# Patient Record
Sex: FEMALE | Race: WHITE | Employment: PART TIME | URBAN - METROPOLITAN AREA
[De-identification: names, ages, dates, MRNs, and addresses within clinical notes are randomized per-mention and may not be internally consistent; named-entity substitution may affect disease eponyms.]

---

## 2017-02-13 ENCOUNTER — OFFICE VISIT (OUTPATIENT)
Dept: INTERNAL MEDICINE CLINIC | Age: 52
End: 2017-02-13

## 2017-02-13 VITALS
BODY MASS INDEX: 36 KG/M2 | DIASTOLIC BLOOD PRESSURE: 64 MMHG | SYSTOLIC BLOOD PRESSURE: 106 MMHG | HEIGHT: 63 IN | HEART RATE: 84 BPM | WEIGHT: 203.2 LBS

## 2017-02-13 DIAGNOSIS — F33.0 MILD EPISODE OF RECURRENT MAJOR DEPRESSIVE DISORDER (HCC): Primary | ICD-10-CM

## 2017-02-13 DIAGNOSIS — F41.9 ANXIETY: ICD-10-CM

## 2017-02-13 PROCEDURE — 99213 OFFICE O/P EST LOW 20 MIN: CPT | Performed by: INTERNAL MEDICINE

## 2017-02-23 ENCOUNTER — TELEPHONE (OUTPATIENT)
Dept: INTERNAL MEDICINE CLINIC | Age: 52
End: 2017-02-23

## 2017-02-23 RX ORDER — LEVOTHYROXINE SODIUM 0.05 MG/1
TABLET ORAL
Qty: 90 TABLET | Refills: 3 | Status: SHIPPED | OUTPATIENT
Start: 2017-02-23 | End: 2017-09-05 | Stop reason: SDUPTHER

## 2017-03-02 RX ORDER — LISINOPRIL 20 MG/1
TABLET ORAL
Qty: 90 TABLET | Refills: 1 | Status: SHIPPED | OUTPATIENT
Start: 2017-03-02 | End: 2017-08-30 | Stop reason: SDUPTHER

## 2017-04-19 ENCOUNTER — PATIENT MESSAGE (OUTPATIENT)
Dept: INTERNAL MEDICINE CLINIC | Age: 52
End: 2017-04-19

## 2017-04-28 RX ORDER — HYDROCHLOROTHIAZIDE 25 MG/1
TABLET ORAL
Qty: 90 TABLET | Refills: 1 | Status: SHIPPED | OUTPATIENT
Start: 2017-04-28 | End: 2017-09-05 | Stop reason: SDUPTHER

## 2017-04-28 RX ORDER — SERTRALINE HYDROCHLORIDE 100 MG/1
100 TABLET, FILM COATED ORAL DAILY
Qty: 30 TABLET | Refills: 5 | Status: SHIPPED | OUTPATIENT
Start: 2017-04-28 | End: 2017-09-05 | Stop reason: SDUPTHER

## 2017-05-25 ENCOUNTER — OFFICE VISIT (OUTPATIENT)
Dept: INTERNAL MEDICINE CLINIC | Age: 52
End: 2017-05-25

## 2017-05-25 VITALS
BODY MASS INDEX: 35.37 KG/M2 | WEIGHT: 199.6 LBS | HEART RATE: 64 BPM | HEIGHT: 63 IN | OXYGEN SATURATION: 97 % | SYSTOLIC BLOOD PRESSURE: 116 MMHG | DIASTOLIC BLOOD PRESSURE: 82 MMHG

## 2017-05-25 DIAGNOSIS — R07.9 CHEST PAIN, UNSPECIFIED TYPE: Primary | ICD-10-CM

## 2017-05-25 DIAGNOSIS — H81.10 BPV (BENIGN POSITIONAL VERTIGO), UNSPECIFIED LATERALITY: ICD-10-CM

## 2017-05-25 PROCEDURE — 93000 ELECTROCARDIOGRAM COMPLETE: CPT | Performed by: INTERNAL MEDICINE

## 2017-05-25 PROCEDURE — 99213 OFFICE O/P EST LOW 20 MIN: CPT | Performed by: INTERNAL MEDICINE

## 2017-05-25 RX ORDER — MECLIZINE HYDROCHLORIDE 25 MG/1
25 TABLET ORAL 3 TIMES DAILY PRN
Qty: 30 TABLET | Refills: 0 | Status: SHIPPED | OUTPATIENT
Start: 2017-05-25 | End: 2017-06-04

## 2017-05-25 ASSESSMENT — PATIENT HEALTH QUESTIONNAIRE - PHQ9
SUM OF ALL RESPONSES TO PHQ9 QUESTIONS 1 & 2: 0
1. LITTLE INTEREST OR PLEASURE IN DOING THINGS: 0
SUM OF ALL RESPONSES TO PHQ QUESTIONS 1-9: 0
2. FEELING DOWN, DEPRESSED OR HOPELESS: 0

## 2017-09-05 RX ORDER — LEVOTHYROXINE SODIUM 0.05 MG/1
TABLET ORAL
Qty: 90 TABLET | Refills: 3 | Status: SHIPPED | OUTPATIENT
Start: 2017-09-05 | End: 2018-08-27 | Stop reason: SDUPTHER

## 2017-09-05 RX ORDER — LISINOPRIL 20 MG/1
20 TABLET ORAL DAILY
Qty: 90 TABLET | Refills: 3 | Status: SHIPPED | OUTPATIENT
Start: 2017-09-05 | End: 2018-08-27 | Stop reason: SDUPTHER

## 2017-09-05 RX ORDER — HYDROCHLOROTHIAZIDE 25 MG/1
25 TABLET ORAL DAILY
Qty: 90 TABLET | Refills: 3 | Status: SHIPPED | OUTPATIENT
Start: 2017-09-05 | End: 2018-08-27 | Stop reason: SDUPTHER

## 2017-09-05 RX ORDER — SERTRALINE HYDROCHLORIDE 100 MG/1
100 TABLET, FILM COATED ORAL DAILY
Qty: 30 TABLET | Refills: 5 | Status: CANCELLED | OUTPATIENT
Start: 2017-09-05

## 2017-09-05 RX ORDER — TRIAMCINOLONE ACETONIDE 1 MG/G
CREAM TOPICAL
Qty: 80 G | Refills: 3 | Status: SHIPPED | OUTPATIENT
Start: 2017-09-05 | End: 2019-12-19 | Stop reason: SDUPTHER

## 2017-09-05 RX ORDER — SERTRALINE HYDROCHLORIDE 100 MG/1
100 TABLET, FILM COATED ORAL DAILY
Qty: 90 TABLET | Refills: 3 | Status: SHIPPED | OUTPATIENT
Start: 2017-09-05 | End: 2017-10-23 | Stop reason: SDUPTHER

## 2017-10-23 RX ORDER — SERTRALINE HYDROCHLORIDE 100 MG/1
100 TABLET, FILM COATED ORAL DAILY
Qty: 90 TABLET | Refills: 3 | Status: SHIPPED | OUTPATIENT
Start: 2017-10-23 | End: 2018-08-27 | Stop reason: SDUPTHER

## 2018-08-17 RX ORDER — IBUPROFEN 800 MG/1
TABLET ORAL
Qty: 120 TABLET | Refills: 2 | Status: SHIPPED | OUTPATIENT
Start: 2018-08-17 | End: 2018-09-27 | Stop reason: SDUPTHER

## 2018-08-27 ENCOUNTER — OFFICE VISIT (OUTPATIENT)
Dept: INTERNAL MEDICINE CLINIC | Age: 53
End: 2018-08-27

## 2018-08-27 VITALS
HEIGHT: 63 IN | DIASTOLIC BLOOD PRESSURE: 76 MMHG | WEIGHT: 204 LBS | BODY MASS INDEX: 36.14 KG/M2 | HEART RATE: 88 BPM | SYSTOLIC BLOOD PRESSURE: 108 MMHG

## 2018-08-27 DIAGNOSIS — I10 ESSENTIAL HYPERTENSION, BENIGN: Primary | ICD-10-CM

## 2018-08-27 DIAGNOSIS — F41.9 ANXIETY: ICD-10-CM

## 2018-08-27 DIAGNOSIS — Z12.31 ENCOUNTER FOR SCREENING MAMMOGRAM FOR BREAST CANCER: ICD-10-CM

## 2018-08-27 DIAGNOSIS — E03.9 ACQUIRED HYPOTHYROIDISM: ICD-10-CM

## 2018-08-27 DIAGNOSIS — Z12.11 SCREENING FOR COLON CANCER: ICD-10-CM

## 2018-08-27 PROCEDURE — 99213 OFFICE O/P EST LOW 20 MIN: CPT | Performed by: INTERNAL MEDICINE

## 2018-08-27 RX ORDER — SERTRALINE HYDROCHLORIDE 100 MG/1
100 TABLET, FILM COATED ORAL DAILY
Qty: 90 TABLET | Refills: 3 | Status: SHIPPED | OUTPATIENT
Start: 2018-08-27 | End: 2018-09-27 | Stop reason: SDUPTHER

## 2018-08-27 RX ORDER — LISINOPRIL 20 MG/1
20 TABLET ORAL DAILY
Qty: 90 TABLET | Refills: 3 | Status: SHIPPED | OUTPATIENT
Start: 2018-08-27 | End: 2018-09-25 | Stop reason: SDUPTHER

## 2018-08-27 RX ORDER — LEVOTHYROXINE SODIUM 0.05 MG/1
TABLET ORAL
Qty: 90 TABLET | Refills: 3 | Status: SHIPPED | OUTPATIENT
Start: 2018-08-27 | End: 2018-09-27 | Stop reason: SDUPTHER

## 2018-08-27 RX ORDER — HYDROCHLOROTHIAZIDE 25 MG/1
25 TABLET ORAL DAILY
Qty: 90 TABLET | Refills: 3 | Status: SHIPPED | OUTPATIENT
Start: 2018-08-27 | End: 2018-09-27 | Stop reason: SDUPTHER

## 2018-08-27 ASSESSMENT — PATIENT HEALTH QUESTIONNAIRE - PHQ9
SUM OF ALL RESPONSES TO PHQ QUESTIONS 1-9: 0
1. LITTLE INTEREST OR PLEASURE IN DOING THINGS: 0
SUM OF ALL RESPONSES TO PHQ QUESTIONS 1-9: 0
2. FEELING DOWN, DEPRESSED OR HOPELESS: 0
SUM OF ALL RESPONSES TO PHQ9 QUESTIONS 1 & 2: 0

## 2018-09-12 LAB
CONTROL: NORMAL
HEMOCCULT STL QL: NORMAL

## 2018-09-12 PROCEDURE — 82274 ASSAY TEST FOR BLOOD FECAL: CPT | Performed by: INTERNAL MEDICINE

## 2018-09-17 DIAGNOSIS — I10 ESSENTIAL HYPERTENSION, BENIGN: ICD-10-CM

## 2018-09-17 DIAGNOSIS — E03.9 ACQUIRED HYPOTHYROIDISM: ICD-10-CM

## 2018-09-17 LAB
ALBUMIN SERPL-MCNC: 4.2 G/DL (ref 3.4–5)
ANION GAP SERPL CALCULATED.3IONS-SCNC: 15 MMOL/L (ref 3–16)
BUN BLDV-MCNC: 17 MG/DL (ref 7–20)
CALCIUM SERPL-MCNC: 9.8 MG/DL (ref 8.3–10.6)
CHLORIDE BLD-SCNC: 103 MMOL/L (ref 99–110)
CO2: 28 MMOL/L (ref 21–32)
CREAT SERPL-MCNC: 0.7 MG/DL (ref 0.6–1.1)
GFR AFRICAN AMERICAN: >60
GFR NON-AFRICAN AMERICAN: >60
GLUCOSE BLD-MCNC: 90 MG/DL (ref 70–99)
PHOSPHORUS: 3.3 MG/DL (ref 2.5–4.9)
POTASSIUM SERPL-SCNC: 4.1 MMOL/L (ref 3.5–5.1)
SODIUM BLD-SCNC: 146 MMOL/L (ref 136–145)
TSH REFLEX: 3.66 UIU/ML (ref 0.27–4.2)

## 2018-09-25 RX ORDER — LISINOPRIL 20 MG/1
TABLET ORAL
Qty: 90 TABLET | Refills: 3 | Status: SHIPPED | OUTPATIENT
Start: 2018-09-25 | End: 2019-04-01 | Stop reason: SDUPTHER

## 2018-09-27 RX ORDER — LEVOTHYROXINE SODIUM 0.05 MG/1
TABLET ORAL
Qty: 90 TABLET | Refills: 3 | Status: SHIPPED | OUTPATIENT
Start: 2018-09-27 | End: 2019-04-01 | Stop reason: SDUPTHER

## 2018-09-27 RX ORDER — IBUPROFEN 800 MG/1
TABLET ORAL
Qty: 120 TABLET | Refills: 5 | Status: SHIPPED | OUTPATIENT
Start: 2018-09-27 | End: 2019-10-21 | Stop reason: SDUPTHER

## 2018-09-27 RX ORDER — HYDROCHLOROTHIAZIDE 25 MG/1
25 TABLET ORAL DAILY
Qty: 90 TABLET | Refills: 3 | Status: SHIPPED | OUTPATIENT
Start: 2018-09-27 | End: 2019-04-01 | Stop reason: SDUPTHER

## 2018-09-27 RX ORDER — SERTRALINE HYDROCHLORIDE 100 MG/1
100 TABLET, FILM COATED ORAL DAILY
Qty: 90 TABLET | Refills: 3 | Status: SHIPPED | OUTPATIENT
Start: 2018-09-27 | End: 2019-04-01 | Stop reason: SDUPTHER

## 2018-12-27 ENCOUNTER — TELEPHONE (OUTPATIENT)
Dept: INTERNAL MEDICINE CLINIC | Age: 53
End: 2018-12-27

## 2019-04-01 ENCOUNTER — OFFICE VISIT (OUTPATIENT)
Dept: INTERNAL MEDICINE CLINIC | Age: 54
End: 2019-04-01

## 2019-04-01 VITALS
WEIGHT: 205.4 LBS | DIASTOLIC BLOOD PRESSURE: 84 MMHG | HEIGHT: 63 IN | HEART RATE: 80 BPM | SYSTOLIC BLOOD PRESSURE: 130 MMHG | BODY MASS INDEX: 36.39 KG/M2

## 2019-04-01 DIAGNOSIS — F41.9 ANXIETY: ICD-10-CM

## 2019-04-01 DIAGNOSIS — I10 ESSENTIAL HYPERTENSION, BENIGN: Primary | ICD-10-CM

## 2019-04-01 DIAGNOSIS — E03.9 ACQUIRED HYPOTHYROIDISM: ICD-10-CM

## 2019-04-01 PROCEDURE — 99212 OFFICE O/P EST SF 10 MIN: CPT | Performed by: NURSE PRACTITIONER

## 2019-04-01 RX ORDER — SERTRALINE HYDROCHLORIDE 100 MG/1
100 TABLET, FILM COATED ORAL DAILY
Qty: 90 TABLET | Refills: 3 | Status: SHIPPED | OUTPATIENT
Start: 2019-04-01 | End: 2019-11-04

## 2019-04-01 RX ORDER — HYDROCHLOROTHIAZIDE 25 MG/1
25 TABLET ORAL DAILY
Qty: 90 TABLET | Refills: 3 | Status: SHIPPED | OUTPATIENT
Start: 2019-04-01 | End: 2020-06-04

## 2019-04-01 RX ORDER — LEVOTHYROXINE SODIUM 0.05 MG/1
TABLET ORAL
Qty: 90 TABLET | Refills: 3 | Status: SHIPPED | OUTPATIENT
Start: 2019-04-01 | End: 2020-05-29

## 2019-04-01 RX ORDER — LISINOPRIL 20 MG/1
TABLET ORAL
Qty: 90 TABLET | Refills: 3 | Status: SHIPPED | OUTPATIENT
Start: 2019-04-01 | End: 2020-02-03

## 2019-04-01 ASSESSMENT — ENCOUNTER SYMPTOMS
WHEEZING: 0
CHEST TIGHTNESS: 0
NAUSEA: 0
BLOOD IN STOOL: 0
DIARRHEA: 0
SHORTNESS OF BREATH: 0
EYE REDNESS: 0
VOMITING: 0
APNEA: 0
RHINORRHEA: 0
ABDOMINAL PAIN: 0
EYE PAIN: 0
SINUS PRESSURE: 0
COUGH: 0
BACK PAIN: 0
CONSTIPATION: 0
ABDOMINAL DISTENTION: 0

## 2019-04-01 NOTE — PROGRESS NOTES
HPI:  4/1/2019    This is a 46 y. o.female   Chief Complaint   Patient presents with    Hypertension    Hypothyroidism    Anxiety     HPI    Isidoro Huitron returns for follow up of hypertension. Patient has been taking Her medications as prescribed. Patient's blood pressure is  controlled. Side effects related to taking the medications include no medication side effects noted. Isidoro Huitron returns for follow up of her hypothyroidism. She has not been having symptoms related to Her thyroid condition. She is not taking Her medications as prescribed. Anxiety- on sertraline   Feeling okay  Was taking 100 mg increased to 150 mg   Doing well on increased dose. /84   Pulse 80   Ht 5' 3\" (1.6 m)   Wt 205 lb 6.4 oz (93.2 kg)   BMI 36.38 kg/m²     Allergies   Allergen Reactions    Morphine Itching       Current Outpatient Medications   Medication Sig Dispense Refill    sertraline (ZOLOFT) 100 MG tablet Take 1 tablet by mouth daily 90 tablet 3    sertraline (ZOLOFT) 50 MG tablet TAKE 1 TABLET BY MOUTH ONCE DAILY COMBINE  WITH  100MG  TABLET  TO  TAKE  TOTAL  OF  150MG 90 tablet 1    levothyroxine (SYNTHROID) 50 MCG tablet TAKE ONE TABLET BY MOUTH ONCE DAILY 90 tablet 3    hydrochlorothiazide (HYDRODIURIL) 25 MG tablet Take 1 tablet by mouth daily 90 tablet 3    lisinopril (PRINIVIL;ZESTRIL) 20 MG tablet TAKE ONE TABLET BY MOUTH ONCE DAILY 90 tablet 3    ibuprofen (IBU) 800 MG tablet TAKE ONE TABLET BY MOUTH EVERY 6 HOURS AS NEEDED FOR PAIN 120 tablet 5    triamcinolone (KENALOG) 0.1 % cream Apply topically 2 times daily. 80 g 3     No current facility-administered medications for this visit. Review of Systems   Constitutional: Negative for appetite change, chills, fatigue and fever. HENT: Negative for congestion, ear pain, rhinorrhea and sinus pressure. Eyes: Negative for pain, redness and visual disturbance.    Respiratory: Negative for apnea, cough, chest tightness, shortness of breath and wheezing. Cardiovascular: Negative for chest pain, palpitations and leg swelling. Gastrointestinal: Negative for abdominal distention, abdominal pain, blood in stool, constipation, diarrhea, nausea and vomiting. Endocrine: Negative for cold intolerance, heat intolerance, polydipsia, polyphagia and polyuria. Genitourinary: Negative for difficulty urinating, dysuria, enuresis, frequency, hematuria and urgency. Musculoskeletal: Negative for back pain, gait problem, joint swelling and neck pain. Skin: Negative for rash and wound. Allergic/Immunologic: Negative for environmental allergies, food allergies and immunocompromised state. Neurological: Negative for dizziness, syncope, light-headedness, numbness and headaches. Hematological: Negative for adenopathy. Does not bruise/bleed easily. Psychiatric/Behavioral: Negative for agitation, behavioral problems and confusion. The patient is not nervous/anxious. Physical Exam   Constitutional: She is oriented to person, place, and time. She appears well-developed and well-nourished. No distress. HENT:   Head: Normocephalic and atraumatic. Eyes: Pupils are equal, round, and reactive to light. EOM are normal.   Neck: Normal range of motion. Neck supple. Cardiovascular: Normal rate, regular rhythm, normal heart sounds and intact distal pulses. No murmur heard. Pulmonary/Chest: Effort normal and breath sounds normal. No respiratory distress. She has no wheezes. Abdominal: Soft. Bowel sounds are normal. There is no tenderness. Musculoskeletal: Normal range of motion. She exhibits no edema. Neurological: She is alert and oriented to person, place, and time. She has normal reflexes. Coordination normal.   Skin: Skin is warm and dry. She is not diaphoretic. No erythema. Psychiatric: She has a normal mood and affect. Thought content normal.     Assessment/Plan:  1.  Essential hypertension, benign  Stable, controlled on current

## 2019-06-10 ENCOUNTER — OFFICE VISIT (OUTPATIENT)
Dept: ENT CLINIC | Age: 54
End: 2019-06-10

## 2019-06-10 VITALS — SYSTOLIC BLOOD PRESSURE: 128 MMHG | HEART RATE: 78 BPM | OXYGEN SATURATION: 96 % | DIASTOLIC BLOOD PRESSURE: 78 MMHG

## 2019-06-10 DIAGNOSIS — H90.3 BILATERAL SENSORINEURAL HEARING LOSS: Primary | ICD-10-CM

## 2019-06-10 PROCEDURE — 99202 OFFICE O/P NEW SF 15 MIN: CPT | Performed by: OTOLARYNGOLOGY

## 2019-06-10 NOTE — PROGRESS NOTES
The patient was referred from her  for medical clearance. She was concerned about a cloudy appearance of the right eardrum although the tympanogram was normal.  There was bilateral sensorineural loss. Patient had ear infections as a child but no surgery or trauma. She has worn hearing aids for approximately 10 years. There is no family history of hearing loss. She denies tinnitus or vertigo. On occasion there is a clicking sound in the ear that is not pulsatile and not related to hearing. Both ear canals are free of any cerumen debris. There is mild tympanosclerotic plaque posteroinferiorly on the right eardrum. The remainder of the drum is normal and there appears to be good aeration of the middle ear space bilaterally. The pars flaccida is unremarkable. There is no periauricular adenopathy or cellulitis. I agree that the patient is a good candidate for amplification. Medical clearance forms were filled.   She may return as needed

## 2019-10-21 RX ORDER — IBUPROFEN 800 MG/1
TABLET ORAL
Qty: 120 TABLET | Refills: 0 | Status: SHIPPED | OUTPATIENT
Start: 2019-10-21 | End: 2019-11-04 | Stop reason: SDUPTHER

## 2019-11-04 ENCOUNTER — OFFICE VISIT (OUTPATIENT)
Dept: INTERNAL MEDICINE CLINIC | Age: 54
End: 2019-11-04

## 2019-11-04 VITALS
BODY MASS INDEX: 35.25 KG/M2 | SYSTOLIC BLOOD PRESSURE: 128 MMHG | DIASTOLIC BLOOD PRESSURE: 76 MMHG | HEART RATE: 78 BPM | WEIGHT: 199 LBS

## 2019-11-04 DIAGNOSIS — F41.9 ANXIETY: Primary | ICD-10-CM

## 2019-11-04 DIAGNOSIS — Z12.11 SCREENING FOR COLON CANCER: ICD-10-CM

## 2019-11-04 DIAGNOSIS — E03.9 ACQUIRED HYPOTHYROIDISM: ICD-10-CM

## 2019-11-04 DIAGNOSIS — I10 ESSENTIAL HYPERTENSION, BENIGN: ICD-10-CM

## 2019-11-04 PROCEDURE — 99214 OFFICE O/P EST MOD 30 MIN: CPT | Performed by: NURSE PRACTITIONER

## 2019-11-04 RX ORDER — IBUPROFEN 800 MG/1
TABLET ORAL
Qty: 120 TABLET | Refills: 0 | Status: SHIPPED | OUTPATIENT
Start: 2019-11-04 | End: 2020-02-03

## 2019-11-04 ASSESSMENT — ENCOUNTER SYMPTOMS
DIARRHEA: 0
CONSTIPATION: 0
SINUS PRESSURE: 0
APNEA: 0
EYE REDNESS: 0
CHEST TIGHTNESS: 0
NAUSEA: 0
ABDOMINAL PAIN: 0
ABDOMINAL DISTENTION: 0
WHEEZING: 0
COUGH: 0
SHORTNESS OF BREATH: 0
EYE PAIN: 0
BLOOD IN STOOL: 0
BACK PAIN: 0
VOMITING: 0
RHINORRHEA: 0

## 2019-11-04 ASSESSMENT — PATIENT HEALTH QUESTIONNAIRE - PHQ9
SUM OF ALL RESPONSES TO PHQ QUESTIONS 1-9: 1
1. LITTLE INTEREST OR PLEASURE IN DOING THINGS: 0
SUM OF ALL RESPONSES TO PHQ9 QUESTIONS 1 & 2: 1
SUM OF ALL RESPONSES TO PHQ QUESTIONS 1-9: 1
2. FEELING DOWN, DEPRESSED OR HOPELESS: 1

## 2019-11-05 LAB
ANION GAP SERPL CALCULATED.3IONS-SCNC: 15 MMOL/L (ref 3–16)
BUN BLDV-MCNC: 19 MG/DL (ref 7–20)
CALCIUM SERPL-MCNC: 10 MG/DL (ref 8.3–10.6)
CHLORIDE BLD-SCNC: 101 MMOL/L (ref 99–110)
CO2: 27 MMOL/L (ref 21–32)
CREAT SERPL-MCNC: 0.7 MG/DL (ref 0.6–1.1)
GFR AFRICAN AMERICAN: >60
GFR NON-AFRICAN AMERICAN: >60
GLUCOSE BLD-MCNC: 87 MG/DL (ref 70–99)
POTASSIUM SERPL-SCNC: 4.7 MMOL/L (ref 3.5–5.1)
SODIUM BLD-SCNC: 143 MMOL/L (ref 136–145)
TSH SERPL DL<=0.05 MIU/L-ACNC: 2.43 UIU/ML (ref 0.27–4.2)

## 2019-12-09 RX ORDER — IBUPROFEN 800 MG/1
TABLET ORAL
Qty: 120 TABLET | Refills: 3 | Status: SHIPPED | OUTPATIENT
Start: 2019-12-09 | End: 2020-09-21

## 2020-02-03 RX ORDER — LISINOPRIL 20 MG/1
TABLET ORAL
Qty: 90 TABLET | Refills: 1 | Status: SHIPPED | OUTPATIENT
Start: 2020-02-03 | End: 2020-08-28

## 2020-05-29 RX ORDER — LEVOTHYROXINE SODIUM 0.05 MG/1
TABLET ORAL
Qty: 90 TABLET | Refills: 0 | Status: SHIPPED | OUTPATIENT
Start: 2020-05-29 | End: 2020-08-26

## 2020-06-04 RX ORDER — HYDROCHLOROTHIAZIDE 25 MG/1
TABLET ORAL
Qty: 90 TABLET | Refills: 0 | Status: SHIPPED | OUTPATIENT
Start: 2020-06-04 | End: 2020-09-04

## 2020-08-26 RX ORDER — LEVOTHYROXINE SODIUM 0.05 MG/1
TABLET ORAL
Qty: 90 TABLET | Refills: 0 | Status: SHIPPED | OUTPATIENT
Start: 2020-08-26 | End: 2020-08-28

## 2020-08-28 RX ORDER — LEVOTHYROXINE SODIUM 0.05 MG/1
TABLET ORAL
Qty: 90 TABLET | Refills: 0 | Status: SHIPPED | OUTPATIENT
Start: 2020-08-28 | End: 2020-09-21 | Stop reason: SDUPTHER

## 2020-08-28 RX ORDER — LISINOPRIL 20 MG/1
TABLET ORAL
Qty: 90 TABLET | Refills: 0 | Status: SHIPPED | OUTPATIENT
Start: 2020-08-28 | End: 2020-09-21 | Stop reason: SDUPTHER

## 2020-09-04 RX ORDER — HYDROCHLOROTHIAZIDE 25 MG/1
TABLET ORAL
Qty: 30 TABLET | Refills: 0 | Status: SHIPPED | OUTPATIENT
Start: 2020-09-04 | End: 2020-09-21 | Stop reason: SDUPTHER

## 2020-09-10 RX ORDER — HYDROCHLOROTHIAZIDE 25 MG/1
TABLET ORAL
Qty: 90 TABLET | Refills: 0 | OUTPATIENT
Start: 2020-09-10

## 2020-09-21 ENCOUNTER — OFFICE VISIT (OUTPATIENT)
Dept: INTERNAL MEDICINE CLINIC | Age: 55
End: 2020-09-21

## 2020-09-21 VITALS
HEART RATE: 80 BPM | BODY MASS INDEX: 36.18 KG/M2 | DIASTOLIC BLOOD PRESSURE: 84 MMHG | TEMPERATURE: 97.7 F | WEIGHT: 204.2 LBS | OXYGEN SATURATION: 95 % | HEIGHT: 63 IN | SYSTOLIC BLOOD PRESSURE: 132 MMHG

## 2020-09-21 DIAGNOSIS — E03.9 ACQUIRED HYPOTHYROIDISM: ICD-10-CM

## 2020-09-21 DIAGNOSIS — M25.50 MULTIPLE JOINT PAIN: ICD-10-CM

## 2020-09-21 DIAGNOSIS — R53.83 FATIGUE, UNSPECIFIED TYPE: ICD-10-CM

## 2020-09-21 DIAGNOSIS — R79.89 ELEVATED LIVER FUNCTION TESTS: ICD-10-CM

## 2020-09-21 DIAGNOSIS — M62.89 MUSCLE FATIGUE: ICD-10-CM

## 2020-09-21 DIAGNOSIS — Z11.4 ENCOUNTER FOR SCREENING FOR HIV: ICD-10-CM

## 2020-09-21 LAB
A/G RATIO: 1.7 (ref 1.1–2.2)
ALBUMIN SERPL-MCNC: 4.2 G/DL (ref 3.4–5)
ALP BLD-CCNC: 89 U/L (ref 40–129)
ALT SERPL-CCNC: 18 U/L (ref 10–40)
ANION GAP SERPL CALCULATED.3IONS-SCNC: 11 MMOL/L (ref 3–16)
AST SERPL-CCNC: 19 U/L (ref 15–37)
BASOPHILS ABSOLUTE: 0 K/UL (ref 0–0.2)
BASOPHILS RELATIVE PERCENT: 0.5 %
BILIRUB SERPL-MCNC: 0.3 MG/DL (ref 0–1)
BUN BLDV-MCNC: 19 MG/DL (ref 7–20)
C-REACTIVE PROTEIN: 4.5 MG/L (ref 0–5.1)
CALCIUM SERPL-MCNC: 10.3 MG/DL (ref 8.3–10.6)
CHLORIDE BLD-SCNC: 104 MMOL/L (ref 99–110)
CHOLESTEROL, TOTAL: 151 MG/DL (ref 0–199)
CO2: 27 MMOL/L (ref 21–32)
CREAT SERPL-MCNC: 0.8 MG/DL (ref 0.6–1.1)
EOSINOPHILS ABSOLUTE: 0.1 K/UL (ref 0–0.6)
EOSINOPHILS RELATIVE PERCENT: 0.9 %
FERRITIN: 36.2 NG/ML (ref 15–150)
GFR AFRICAN AMERICAN: >60
GFR NON-AFRICAN AMERICAN: >60
GLOBULIN: 2.5 G/DL
GLUCOSE BLD-MCNC: 88 MG/DL (ref 70–99)
HCT VFR BLD CALC: 38.7 % (ref 36–48)
HDLC SERPL-MCNC: 46 MG/DL (ref 40–60)
HEMOGLOBIN: 12.9 G/DL (ref 12–16)
IRON SATURATION: 30 % (ref 15–50)
IRON: 95 UG/DL (ref 37–145)
LDL CHOLESTEROL CALCULATED: 65 MG/DL
LYMPHOCYTES ABSOLUTE: 1.5 K/UL (ref 1–5.1)
LYMPHOCYTES RELATIVE PERCENT: 20 %
MAGNESIUM: 2.2 MG/DL (ref 1.8–2.4)
MCH RBC QN AUTO: 29.9 PG (ref 26–34)
MCHC RBC AUTO-ENTMCNC: 33.3 G/DL (ref 31–36)
MCV RBC AUTO: 89.9 FL (ref 80–100)
MONOCYTES ABSOLUTE: 0.4 K/UL (ref 0–1.3)
MONOCYTES RELATIVE PERCENT: 5.3 %
NEUTROPHILS ABSOLUTE: 5.6 K/UL (ref 1.7–7.7)
NEUTROPHILS RELATIVE PERCENT: 73.3 %
PDW BLD-RTO: 13.4 % (ref 12.4–15.4)
PHOSPHORUS: 3.4 MG/DL (ref 2.5–4.9)
PLATELET # BLD: 264 K/UL (ref 135–450)
PMV BLD AUTO: 8.7 FL (ref 5–10.5)
POTASSIUM SERPL-SCNC: 4.2 MMOL/L (ref 3.5–5.1)
RBC # BLD: 4.31 M/UL (ref 4–5.2)
RHEUMATOID FACTOR: 12 IU/ML
SEDIMENTATION RATE, ERYTHROCYTE: 23 MM/HR (ref 0–30)
SODIUM BLD-SCNC: 142 MMOL/L (ref 136–145)
TOTAL IRON BINDING CAPACITY: 318 UG/DL (ref 260–445)
TOTAL PROTEIN: 6.7 G/DL (ref 6.4–8.2)
TRIGL SERPL-MCNC: 199 MG/DL (ref 0–150)
TSH REFLEX FT4: 3.5 UIU/ML (ref 0.27–4.2)
URIC ACID, SERUM: 6.2 MG/DL (ref 2.6–6)
VITAMIN D 25-HYDROXY: 41.8 NG/ML
VLDLC SERPL CALC-MCNC: 40 MG/DL
WBC # BLD: 7.6 K/UL (ref 4–11)

## 2020-09-21 PROCEDURE — 99215 OFFICE O/P EST HI 40 MIN: CPT | Performed by: NURSE PRACTITIONER

## 2020-09-21 RX ORDER — FERROUS SULFATE 325(65) MG
325 TABLET ORAL
COMMUNITY
End: 2021-12-13

## 2020-09-21 RX ORDER — OMEPRAZOLE 20 MG/1
20 CAPSULE, DELAYED RELEASE ORAL AS NEEDED
COMMUNITY

## 2020-09-21 RX ORDER — LISINOPRIL 20 MG/1
TABLET ORAL
Qty: 90 TABLET | Refills: 1 | Status: SHIPPED | OUTPATIENT
Start: 2020-09-21 | End: 2021-05-25

## 2020-09-21 RX ORDER — HYDROCHLOROTHIAZIDE 25 MG/1
TABLET ORAL
Qty: 90 TABLET | Refills: 1 | Status: SHIPPED | OUTPATIENT
Start: 2020-09-21 | End: 2021-03-12

## 2020-09-21 RX ORDER — LEVOTHYROXINE SODIUM 0.05 MG/1
TABLET ORAL
Qty: 90 TABLET | Refills: 1 | Status: SHIPPED | OUTPATIENT
Start: 2020-09-21 | End: 2021-08-27

## 2020-09-21 RX ORDER — CETIRIZINE HYDROCHLORIDE 10 MG/1
1 CAPSULE, LIQUID FILLED ORAL DAILY
COMMUNITY
End: 2021-12-13

## 2020-09-21 RX ORDER — VITAMIN B COMPLEX
1 TABLET ORAL DAILY
COMMUNITY
End: 2021-12-13

## 2020-09-21 ASSESSMENT — PATIENT HEALTH QUESTIONNAIRE - PHQ9
SUM OF ALL RESPONSES TO PHQ QUESTIONS 1-9: 0
SUM OF ALL RESPONSES TO PHQ9 QUESTIONS 1 & 2: 0
1. LITTLE INTEREST OR PLEASURE IN DOING THINGS: 0
2. FEELING DOWN, DEPRESSED OR HOPELESS: 0
SUM OF ALL RESPONSES TO PHQ QUESTIONS 1-9: 0

## 2020-09-21 ASSESSMENT — ENCOUNTER SYMPTOMS
COUGH: 0
SHORTNESS OF BREATH: 0
WHEEZING: 0
CHEST TIGHTNESS: 0

## 2020-09-21 NOTE — PROGRESS NOTES
9/21/20     Chief Complaint   Patient presents with    Medication Refill    Fatigue     pt states that she takes her iron but she gets fatigue in her arms and most recently in her legs     HPI    Here for follow-up. Feels like she is having increased muscle fatigue and weakness. She also reports all over joint pain. Symptoms comes and go. Going on for the past few months, worsening in frequency and intensity. She has been taking iron without relief. All over muscle and joint aches. She works as a  - which is making pain and weakness more prevalent. Taking tylenol and ibuprofen with some relief - taking it more than she was. Had to decrease hours at work due to pain, generalized fatigue and muscle weakness. Allergies   Allergen Reactions    Morphine Itching       Current Outpatient Medications   Medication Sig Dispense Refill    fluocinonide (LIDEX) 0.05 % cream Apply topically 2 times daily      Zinc Acetate-Sweetleaf (FP ZINC LOZENGES PO) Take 2 tablets by mouth daily      omeprazole (PRILOSEC) 20 MG delayed release capsule Take 20 mg by mouth daily      Cetirizine HCl (ZYRTEC ALLERGY) 10 MG CAPS Take 1 tablet by mouth daily      ferrous sulfate (IRON 325) 325 (65 Fe) MG tablet Take 325 mg by mouth daily (with breakfast)      Coenzyme Q10 (COQ10) 100 MG CAPS Take 1 tablet by mouth daily      hydroCHLOROthiazide (HYDRODIURIL) 25 MG tablet TAKE ONE TABLET BY MOUTH DAILY 90 tablet 1    levothyroxine (SYNTHROID) 50 MCG tablet TAKE ONE TABLET BY MOUTH DAILY 90 tablet 1    lisinopril (PRINIVIL;ZESTRIL) 20 MG tablet TAKE ONE TABLET BY MOUTH DAILY 90 tablet 1     No current facility-administered medications for this visit. Review of Systems   Constitutional: Positive for fatigue. Negative for chills and fever. Respiratory: Negative for cough, chest tightness, shortness of breath and wheezing. Cardiovascular: Negative for chest pain, palpitations and leg swelling. Musculoskeletal: Positive for arthralgias and myalgias. Negative for gait problem and joint swelling. Neurological: Positive for weakness. Negative for dizziness, tremors, seizures, syncope, light-headedness and headaches. Vitals:    09/21/20 1201   BP: 132/84   Pulse: 80   Temp: 97.7 °F (36.5 °C)   SpO2: 95%   Weight: 204 lb 3.2 oz (92.6 kg)   Height: 5' 3\" (1.6 m)      Physical Exam  Vitals signs reviewed. Constitutional:       General: She is not in acute distress. Appearance: Normal appearance. She is well-developed. She is obese. She is not ill-appearing or diaphoretic. HENT:      Head: Normocephalic and atraumatic. Cardiovascular:      Rate and Rhythm: Normal rate and regular rhythm. Heart sounds: Normal heart sounds. No murmur. Pulmonary:      Effort: Pulmonary effort is normal. No respiratory distress. Breath sounds: Normal breath sounds. No wheezing or rhonchi. Abdominal:      General: Bowel sounds are normal.      Palpations: Abdomen is soft. Musculoskeletal:         General: Tenderness present. Right lower leg: No edema. Left lower leg: No edema. Skin:     General: Skin is warm and dry. Neurological:      General: No focal deficit present. Mental Status: She is alert and oriented to person, place, and time. Mental status is at baseline. Gait: Gait normal.      Comments: CN II - XII grossly intact   Psychiatric:         Mood and Affect: Mood and affect normal.         Behavior: Behavior normal.       Assessment/Plan:  1. Muscle fatigue  Stable, uncontrolled  Checking labs - consider MRI brain pending labs   - Comprehensive Metabolic Panel; Future  - CBC Auto Differential; Future  - Lipid Panel; Future  - Vitamin D 25 Hydroxy; Future  - TSH WITH REFLEX TO FT4; Future  - Iron and TIBC; Future  - Vitamin B12 & Folate; Future  - Methylmalonic Acid, Serum; Future  - Ferritin; Future  - Hemoglobin A1C; Future  - Phosphorus; Future  - Magnesium;  Future  - Sedimentation Rate; Future  - C-Reactive Protein; Future  - Uric Acid; Future  - Rheumatoid Factor; Future  - Cyclic Citrul Peptide Antibody, IgG; Future    2. Multiple joint pain  See 1  - Comprehensive Metabolic Panel; Future  - CBC Auto Differential; Future  - Lipid Panel; Future  - Vitamin D 25 Hydroxy; Future  - TSH WITH REFLEX TO FT4; Future  - Iron and TIBC; Future  - Vitamin B12 & Folate; Future  - Methylmalonic Acid, Serum; Future  - Ferritin; Future  - Hemoglobin A1C; Future  - Phosphorus; Future  - Magnesium; Future  - Sedimentation Rate; Future  - C-Reactive Protein; Future  - Uric Acid; Future  - Rheumatoid Factor; Future  - Cyclic Citrul Peptide Antibody, IgG; Future    3. Fatigue, unspecified type  See 1  - Comprehensive Metabolic Panel; Future  - CBC Auto Differential; Future  - Lipid Panel; Future  - Vitamin D 25 Hydroxy; Future  - TSH WITH REFLEX TO FT4; Future  - Iron and TIBC; Future  - Vitamin B12 & Folate; Future  - Methylmalonic Acid, Serum; Future  - Ferritin; Future  - Hemoglobin A1C; Future  - Phosphorus; Future  - Magnesium; Future  - Sedimentation Rate; Future  - C-Reactive Protein; Future  - Uric Acid; Future  - Rheumatoid Factor; Future  - Cyclic Citrul Peptide Antibody, IgG; Future    4. Essential hypertension, benign  Stable, controlled on current regimen. - hydroCHLOROthiazide (HYDRODIURIL) 25 MG tablet; TAKE ONE TABLET BY MOUTH DAILY  Dispense: 90 tablet; Refill: 1  - lisinopril (PRINIVIL;ZESTRIL) 20 MG tablet; TAKE ONE TABLET BY MOUTH DAILY  Dispense: 90 tablet; Refill: 1    5. Hyperthyroidism  Stable, controlled on current regimen. 6. Multinodular goiter  Stable, controlled on current regimen. 7. Acquired hypothyroidism  Stable, controlled on current regimen.     - TSH WITH REFLEX TO FT4; Future  - levothyroxine (SYNTHROID) 50 MCG tablet; TAKE ONE TABLET BY MOUTH DAILY  Dispense: 90 tablet; Refill: 1    8. Anxiety  Stable, controlled on current regimen.        9. Elevated liver function tests  Stable, controlled on current regimen. - Comprehensive Metabolic Panel; Future    10. Family history of diabetes mellitus (DM)  Stable, controlled on current regimen. - Hemoglobin A1C; Future    11. Breast cancer screening    - ANTONY DIGITAL SCREEN W OR WO CAD BILATERAL; Future    12. Encounter for screening for HIV  - HIV Screen; Future    Discussed medications with patient, who voiced understanding of their use and indications. All questions answered. Return in about 4 weeks (around 10/19/2020), or if symptoms worsen or fail to improve. Electronically signed by DEEPAK Chance CNP on 9/21/2020 at 1:30 PM     50 min spent with patient- >50 % continuity of care and/or counseling.

## 2020-09-22 LAB
CYCLIC CITRULLINATED PEPTIDE ANTIBODY IGG: <0.5 U/ML (ref 0–2.9)
FOLATE: 13.74 NG/ML (ref 4.78–24.2)
HIV AG/AB: NORMAL
HIV ANTIGEN: NORMAL
HIV-1 ANTIBODY: NORMAL
HIV-2 AB: NORMAL
VITAMIN B-12: 386 PG/ML (ref 211–911)

## 2020-09-24 LAB — METHYLMALONIC ACID: 0.2 UMOL/L (ref 0–0.4)

## 2020-10-26 ENCOUNTER — HOSPITAL ENCOUNTER (OUTPATIENT)
Dept: WOMENS IMAGING | Age: 55
Discharge: HOME | End: 2020-10-26
Payer: COMMERCIAL

## 2020-10-26 ENCOUNTER — HOSPITAL ENCOUNTER (OUTPATIENT)
Dept: MRI IMAGING | Age: 55
Discharge: HOME | End: 2020-10-26
Payer: COMMERCIAL

## 2020-10-26 PROCEDURE — 70553 MRI BRAIN STEM W/O & W/DYE: CPT

## 2020-10-26 PROCEDURE — 2580000003 HC RX 258: Performed by: INTERNAL MEDICINE

## 2020-10-26 PROCEDURE — A9577 INJ MULTIHANCE: HCPCS | Performed by: INTERNAL MEDICINE

## 2020-10-26 PROCEDURE — 6360000004 HC RX CONTRAST MEDICATION: Performed by: INTERNAL MEDICINE

## 2020-10-26 PROCEDURE — 77067 SCR MAMMO BI INCL CAD: CPT

## 2020-10-26 RX ORDER — SODIUM CHLORIDE 0.9 % (FLUSH) 0.9 %
10 SYRINGE (ML) INJECTION ONCE
Status: COMPLETED | OUTPATIENT
Start: 2020-10-26 | End: 2020-10-26

## 2020-10-26 RX ADMIN — Medication 10 ML: at 10:33

## 2020-10-26 RX ADMIN — GADOBENATE DIMEGLUMINE 17 ML: 529 INJECTION, SOLUTION INTRAVENOUS at 10:33

## 2020-11-04 ENCOUNTER — OFFICE VISIT (OUTPATIENT)
Dept: INTERNAL MEDICINE CLINIC | Age: 55
End: 2020-11-04

## 2020-11-04 VITALS
DIASTOLIC BLOOD PRESSURE: 82 MMHG | SYSTOLIC BLOOD PRESSURE: 128 MMHG | TEMPERATURE: 97.3 F | WEIGHT: 205.2 LBS | BODY MASS INDEX: 36.35 KG/M2 | HEART RATE: 72 BPM | OXYGEN SATURATION: 98 %

## 2020-11-04 PROBLEM — I78.1 TELANGIECTASIA DISORDER: Status: ACTIVE | Noted: 2020-11-04

## 2020-11-04 PROCEDURE — 99213 OFFICE O/P EST LOW 20 MIN: CPT | Performed by: NURSE PRACTITIONER

## 2020-11-04 RX ORDER — DULOXETIN HYDROCHLORIDE 30 MG/1
30 CAPSULE, DELAYED RELEASE ORAL DAILY
Qty: 30 CAPSULE | Refills: 1 | Status: SHIPPED | OUTPATIENT
Start: 2020-11-04 | End: 2020-11-30 | Stop reason: SDUPTHER

## 2020-11-04 ASSESSMENT — ENCOUNTER SYMPTOMS
WHEEZING: 0
SHORTNESS OF BREATH: 0
COUGH: 0
CHEST TIGHTNESS: 0

## 2020-11-04 NOTE — PROGRESS NOTES
11/4/20     Chief Complaint   Patient presents with    Results     mammo and MRI     HPI    Here for follow-up after MRI for muscle fatigue, weakness, and joint pain. Muscle fatigue and weakness has been going on for the past few months and is unchanged from last visit. Joint pain is unchanged from last visit and is pt reports it \"moves around\"  Symptoms comes and go. Has been taking iron off and on without consistency and this has not improved symptoms. She works as a  and has reduced her schedule to 3x a week - muscle fatigue is much worse on her work days. Taking tylenol and ibuprofen with some relief. States pressure from touch also causes intermittent pins and needles. Has been seeing her chiropractor for her hip pain. MRI with and without contrast completed on 10/26/2020  Narrative    EXAMINATION:    MRI OF THE BRAIN WITHOUT AND WITH CONTRAST  10/26/2020 9:41 am         TECHNIQUE:    Multiplanar multisequence MRI of the head/brain was performed without and    with the administration of intravenous contrast.         COMPARISON:    None.         HISTORY:    ORDERING SYSTEM PROVIDED HISTORY: Fatigue, unspecified type    TECHNOLOGIST PROVIDED HISTORY:    Reason for Exam: PT BEING EVALUATED FOR NUMBNESS AND MUSCLE SPASMS AND BILAT    LEG WEAKNESS FOR 18 MONTHS    Acuity: Acute    Type of Exam: Initial    Additional signs and symptoms: PT BEING EVALUATED FOR NUMBNESS AND MUSCLE    SPASMS AND BILAT  LEG WEAKNESS FOR 18 MONTHS    Relevant Medical/Surgical History: PT BEING EVALUATED FOR NUMBNESS AND MUSCLE    SPASMS AND BILAT  LEG WEAKNESS FOR 18 MONTHS         FINDINGS:    INTRACRANIAL STRUCTURES/VENTRICLES: Nathalie Childress is no acute infarct. No mass    effect or midline shift. No evidence of an acute intracranial hemorrhage.     The ventricles and sulci are normal in size and configuration.  The    sellar/suprasellar regions appear unremarkable.  The normal signal voids    within the major of breath and wheezing. Cardiovascular: Negative for chest pain, palpitations and leg swelling. Musculoskeletal: Positive for arthralgias and myalgias. Negative for gait problem and joint swelling. Neurological: Positive for weakness. Negative for dizziness, tremors, seizures, syncope, light-headedness and headaches. Vitals:    11/04/20 1048   BP: 128/82   Pulse: 72   Temp: 97.3 °F (36.3 °C)   SpO2: 98%   Weight: 205 lb 3.2 oz (93.1 kg)      Physical Exam  Vitals signs reviewed. Constitutional:       General: She is not in acute distress. Appearance: Normal appearance. She is well-developed. She is obese. She is not ill-appearing or diaphoretic. HENT:      Head: Normocephalic and atraumatic. Cardiovascular:      Rate and Rhythm: Normal rate and regular rhythm. Heart sounds: Normal heart sounds. No murmur. Pulmonary:      Effort: Pulmonary effort is normal. No respiratory distress. Breath sounds: Normal breath sounds. No wheezing or rhonchi. Abdominal:      General: Bowel sounds are normal.      Palpations: Abdomen is soft. Musculoskeletal:         General: Tenderness present. Right lower leg: No edema. Left lower leg: No edema. Skin:     General: Skin is warm and dry. Neurological:      General: No focal deficit present. Mental Status: She is alert and oriented to person, place, and time. Mental status is at baseline. Gait: Gait normal.      Comments: CN II - XII grossly intact   Psychiatric:         Mood and Affect: Mood and affect normal.         Behavior: Behavior normal.       Assessment/Plan:  Rocio Reyes was seen today for results. Diagnoses and all orders for this visit:  1. 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   -Patient symptoms of muscle pain, weakness, fatigue, and joint pain are unchanged since last visit. -Given symptoms and MRI results will refer to neurology for further plan of care.   - Arpan Park MD, Neurology,

## 2020-11-18 ENCOUNTER — PATIENT MESSAGE (OUTPATIENT)
Dept: INTERNAL MEDICINE CLINIC | Age: 55
End: 2020-11-18

## 2020-11-18 NOTE — TELEPHONE ENCOUNTER
From: Ru Johns  To: DEEPAK Valadez - CNP  Sent: 11/18/2020 1:40 PM EST  Subject: Prescription Question    The DULoxetine HCL DR 30MG doesnt seem to be working, Im thinking we may have to raise the dosage! Thank you!

## 2020-11-30 ENCOUNTER — TELEPHONE (OUTPATIENT)
Dept: INTERNAL MEDICINE CLINIC | Age: 55
End: 2020-11-30

## 2020-11-30 RX ORDER — DULOXETIN HYDROCHLORIDE 60 MG/1
60 CAPSULE, DELAYED RELEASE ORAL DAILY
Qty: 90 CAPSULE | Refills: 1 | Status: SHIPPED | OUTPATIENT
Start: 2020-11-30 | End: 2021-03-12 | Stop reason: SDUPTHER

## 2020-11-30 NOTE — TELEPHONE ENCOUNTER
Patient has 4 pills left of the duloxetine 30 mg. She will need new script sent to her pharmacy for the 60 mg.

## 2020-12-01 ENCOUNTER — TELEPHONE (OUTPATIENT)
Dept: INTERNAL MEDICINE CLINIC | Age: 55
End: 2020-12-01

## 2020-12-01 NOTE — TELEPHONE ENCOUNTER
----- Message from Reddy Gutierrez sent at 12/1/2020  2:54 PM EST -----  Subject: Message to Provider    QUESTIONS  Information for Provider? PATIENT CALLED TO SEE WHY SHE HASN'T RECIEVED   MEDICATION DULoxetine (CYMBALTA) 60 MG extended release capsule. IS IT   POSSIBLE THE MEDICATION WAS SENT TO THE WRONG [facility] [facility]. ---------------------------------------------------------------------------  --------------  Chiara DAMON  What is the best way for the office to contact you? OK to leave message on   voicemail  Preferred Call Back Phone Number? [phone]  ---------------------------------------------------------------------------  --------------  SCRIPT ANSWERS  Relationship to Patient?  Self

## 2020-12-16 ENCOUNTER — HOSPITAL ENCOUNTER (OUTPATIENT)
Dept: MRI IMAGING | Age: 55
Discharge: HOME | End: 2020-12-16

## 2020-12-16 PROCEDURE — 72148 MRI LUMBAR SPINE W/O DYE: CPT

## 2021-03-11 DIAGNOSIS — I10 ESSENTIAL HYPERTENSION, BENIGN: ICD-10-CM

## 2021-03-12 DIAGNOSIS — M62.89 MUSCLE FATIGUE: ICD-10-CM

## 2021-03-12 DIAGNOSIS — R53.1 WEAKNESS: ICD-10-CM

## 2021-03-12 DIAGNOSIS — I10 ESSENTIAL HYPERTENSION, BENIGN: ICD-10-CM

## 2021-03-12 DIAGNOSIS — M25.50 MULTIPLE JOINT PAIN: ICD-10-CM

## 2021-03-12 RX ORDER — HYDROCHLOROTHIAZIDE 25 MG/1
TABLET ORAL
Qty: 90 TABLET | Refills: 0 | Status: SHIPPED | OUTPATIENT
Start: 2021-03-12 | End: 2021-03-12 | Stop reason: SDUPTHER

## 2021-03-13 RX ORDER — HYDROCHLOROTHIAZIDE 25 MG/1
TABLET ORAL
Qty: 90 TABLET | Refills: 0 | Status: SHIPPED | OUTPATIENT
Start: 2021-03-13 | End: 2021-10-11

## 2021-03-13 RX ORDER — DULOXETIN HYDROCHLORIDE 60 MG/1
60 CAPSULE, DELAYED RELEASE ORAL DAILY
Qty: 90 CAPSULE | Refills: 1 | Status: SHIPPED | OUTPATIENT
Start: 2021-03-13 | End: 2021-08-04

## 2021-03-17 RX ORDER — IBUPROFEN 800 MG/1
800 TABLET ORAL EVERY 8 HOURS PRN
Qty: 90 TABLET | Refills: 0 | Status: SHIPPED | OUTPATIENT
Start: 2021-03-17 | End: 2021-12-13

## 2021-05-24 DIAGNOSIS — Z12.11 COLON CANCER SCREENING: Primary | ICD-10-CM

## 2021-05-24 LAB
CONTROL: NORMAL
HEMOCCULT STL QL: NEGATIVE

## 2021-05-24 PROCEDURE — 82274 ASSAY TEST FOR BLOOD FECAL: CPT | Performed by: INTERNAL MEDICINE

## 2021-05-25 DIAGNOSIS — I10 ESSENTIAL HYPERTENSION, BENIGN: ICD-10-CM

## 2021-05-25 RX ORDER — LISINOPRIL 20 MG/1
TABLET ORAL
Qty: 90 TABLET | Refills: 0 | Status: SHIPPED | OUTPATIENT
Start: 2021-05-25 | End: 2021-08-27

## 2021-06-28 ENCOUNTER — TELEPHONE (OUTPATIENT)
Dept: INTERNAL MEDICINE CLINIC | Age: 56
End: 2021-06-28

## 2021-06-28 NOTE — TELEPHONE ENCOUNTER
Pt calling had MRI done 6 mo ago of her brain and is asking to do 6 mo f/u MRI ---please call pt. Thanks.

## 2021-07-12 ENCOUNTER — OFFICE VISIT (OUTPATIENT)
Dept: INTERNAL MEDICINE CLINIC | Age: 56
End: 2021-07-12
Payer: COMMERCIAL

## 2021-07-12 VITALS
OXYGEN SATURATION: 98 % | SYSTOLIC BLOOD PRESSURE: 126 MMHG | DIASTOLIC BLOOD PRESSURE: 84 MMHG | HEART RATE: 70 BPM | BODY MASS INDEX: 35.43 KG/M2 | WEIGHT: 200 LBS

## 2021-07-12 DIAGNOSIS — I78.1 TELANGIECTASIA DISORDER: Primary | ICD-10-CM

## 2021-07-12 DIAGNOSIS — M48.062 PSEUDOCLAUDICATION SYNDROME: ICD-10-CM

## 2021-07-12 DIAGNOSIS — M48.061 SPINAL STENOSIS OF LUMBAR REGION WITHOUT NEUROGENIC CLAUDICATION: ICD-10-CM

## 2021-07-12 DIAGNOSIS — R90.89 ABNORMAL FINDING ON MRI OF BRAIN: ICD-10-CM

## 2021-07-12 PROCEDURE — 99215 OFFICE O/P EST HI 40 MIN: CPT | Performed by: NURSE PRACTITIONER

## 2021-07-12 RX ORDER — METHYLPREDNISOLONE 4 MG/1
TABLET ORAL
Qty: 1 KIT | Refills: 0 | Status: SHIPPED | OUTPATIENT
Start: 2021-07-12 | End: 2021-07-12 | Stop reason: SDUPTHER

## 2021-07-12 RX ORDER — METHYLPREDNISOLONE 4 MG/1
TABLET ORAL
Qty: 1 KIT | Refills: 0 | Status: SHIPPED | OUTPATIENT
Start: 2021-07-12 | End: 2021-12-13

## 2021-07-12 SDOH — ECONOMIC STABILITY: FOOD INSECURITY: WITHIN THE PAST 12 MONTHS, YOU WORRIED THAT YOUR FOOD WOULD RUN OUT BEFORE YOU GOT MONEY TO BUY MORE.: NEVER TRUE

## 2021-07-12 SDOH — ECONOMIC STABILITY: FOOD INSECURITY: WITHIN THE PAST 12 MONTHS, THE FOOD YOU BOUGHT JUST DIDN'T LAST AND YOU DIDN'T HAVE MONEY TO GET MORE.: NEVER TRUE

## 2021-07-12 ASSESSMENT — PATIENT HEALTH QUESTIONNAIRE - PHQ9
SUM OF ALL RESPONSES TO PHQ9 QUESTIONS 1 & 2: 0
1. LITTLE INTEREST OR PLEASURE IN DOING THINGS: 0
SUM OF ALL RESPONSES TO PHQ QUESTIONS 1-9: 0
SUM OF ALL RESPONSES TO PHQ QUESTIONS 1-9: 0
2. FEELING DOWN, DEPRESSED OR HOPELESS: 0
SUM OF ALL RESPONSES TO PHQ QUESTIONS 1-9: 0

## 2021-07-12 ASSESSMENT — SOCIAL DETERMINANTS OF HEALTH (SDOH): HOW HARD IS IT FOR YOU TO PAY FOR THE VERY BASICS LIKE FOOD, HOUSING, MEDICAL CARE, AND HEATING?: NOT HARD AT ALL

## 2021-07-12 NOTE — PROGRESS NOTES
7/12/21     Chief Complaint   Patient presents with    Orders     wanting MRI ordered for due to last one showing some things     Back Pain     HPI     Pt is here requesting an MRI of her brain. She was last seen in November of 2020 and is over due for follow up here. She had MRI brain in October of last year for muscle fatigue, weakness and joint pain that had been ongoing. Pt was given a referral to see [facility] neurology for abnormal MRI - possible telangiectasia vs ?   recommended follow up MRI of the brain - pt did not have complete. during appt with neurology complained of worsening leg pain, weakness, numbness so MRI of lumbar was complete noting mild - moderate stenosis of the lumbar spine. She reports she declined PT at the time. Today she reports worsening back and leg pain. Reports having \"problems with her legs\"      Pt was started on duloxetine for all over arthralgia/ myalgias, helps with all over joint pain and pain to touch. Does not help with lumbar pain or radicular pain. She is a  and working 3 days a week (25-30 hr per week) - days she works makes the pain worse. Allergies   Allergen Reactions    Morphine Itching     Current Outpatient Medications   Medication Sig Dispense Refill    methylPREDNISolone (MEDROL DOSEPACK) 4 MG tablet Take by mouth.  1 kit 0    lisinopril (PRINIVIL;ZESTRIL) 20 MG tablet TAKE ONE TABLET BY MOUTH DAILY 90 tablet 0    ibuprofen (ADVIL;MOTRIN) 800 MG tablet Take 1 tablet by mouth every 8 hours as needed for Pain 90 tablet 0    DULoxetine (CYMBALTA) 60 MG extended release capsule Take 1 capsule by mouth daily 90 capsule 1    hydroCHLOROthiazide (HYDRODIURIL) 25 MG tablet TAKE ONE TABLET BY MOUTH DAILY **MUST CALL MD FOR APPOINTMENT 90 tablet 0    fluocinonide (LIDEX) 0.05 % cream Apply topically 2 times daily      Zinc Acetate-Sweetleaf (FP ZINC LOZENGES PO) Take 2 tablets by mouth daily      omeprazole (PRILOSEC) 20 MG for repeat MRI   - MRI BRAIN W WO CONTRAST; Future  - Renal Function Panel; Future    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 Dispense: 1 kit; Refill: 0    Discussed medications with patient, who voiced understanding of their use and indications. All questions answered. Follow up in 4 months and prn     Electronically signed by DEEPAK Malone CNP on 7/12/2021 at 2:13 PM     45 min spent with patient- >50 % continuity of care and/or counseling.

## 2021-07-16 ENCOUNTER — TELEPHONE (OUTPATIENT)
Dept: INTERNAL MEDICINE CLINIC | Age: 56
End: 2021-07-16

## 2021-07-20 ENCOUNTER — TELEPHONE (OUTPATIENT)
Dept: INTERNAL MEDICINE CLINIC | Age: 56
End: 2021-07-20

## 2021-07-20 NOTE — TELEPHONE ENCOUNTER
Please let patient know that she may need to reschedule if we are unable to get approval prior to her appt.

## 2021-07-20 NOTE — TELEPHONE ENCOUNTER
Alma from [facility] called stating pt has an appt. Today at 1:45 today. She said that the Dr needs to call and a peer to peer called and pushed through. The number to call is [phone]. Alma said you would have to give them Id # on the insurance card and some other info.

## 2021-07-20 NOTE — TELEPHONE ENCOUNTER
Called and spoke with Josy Carter to advise we will not be able to do the Peer to peer. The ordering provider is not in the office today. This will have to be rescheduled she will call the patient and advise.

## 2021-07-26 ENCOUNTER — TELEPHONE (OUTPATIENT)
Dept: INTERNAL MEDICINE CLINIC | Age: 56
End: 2021-07-26

## 2021-07-26 DIAGNOSIS — M48.062 PSEUDOCLAUDICATION SYNDROME: ICD-10-CM

## 2021-07-26 DIAGNOSIS — M48.061 SPINAL STENOSIS OF LUMBAR REGION WITHOUT NEUROGENIC CLAUDICATION: ICD-10-CM

## 2021-07-26 NOTE — TELEPHONE ENCOUNTER
Austin Erazo from [facility] calling---pt insurance calling needs pier to pier done to do MRI ---please call Austin Erazo at [phone]. Thanks.

## 2021-07-26 NOTE — TELEPHONE ENCOUNTER
peer to peer complete   authorization code [id number]  July 19-Sept 16 2021  [facility]

## 2021-09-08 ENCOUNTER — PATIENT MESSAGE (OUTPATIENT)
Dept: INTERNAL MEDICINE CLINIC | Age: 56
End: 2021-09-08

## 2021-09-08 RX ORDER — DULOXETIN HYDROCHLORIDE 30 MG/1
CAPSULE, DELAYED RELEASE ORAL
Qty: 30 CAPSULE | Refills: 0 | Status: SHIPPED | OUTPATIENT
Start: 2021-09-08 | End: 2021-12-13

## 2021-09-08 NOTE — TELEPHONE ENCOUNTER
From: Kim Guerrier  To: DEEPAK Mosquera - CNP  Sent: 9/8/2021 12:56 PM EDT  Subject: Prescription Question    Hi Lincoln Councilman I am interested in doing a fibromyalgia study with [facility] and they have asked me to ween off of the cymbalta just for the 8 to 16 weeks of the study! Can I get that prescription reduced to 30 so I can ween off of it?

## 2021-10-09 DIAGNOSIS — I10 ESSENTIAL HYPERTENSION, BENIGN: ICD-10-CM

## 2021-10-09 DIAGNOSIS — E03.9 ACQUIRED HYPOTHYROIDISM: ICD-10-CM

## 2021-10-11 RX ORDER — HYDROCHLOROTHIAZIDE 25 MG/1
TABLET ORAL
Qty: 90 TABLET | Refills: 1 | Status: SHIPPED | OUTPATIENT
Start: 2021-10-11 | End: 2022-04-10 | Stop reason: SDUPTHER

## 2021-10-11 RX ORDER — LEVOTHYROXINE SODIUM 0.05 MG/1
TABLET ORAL
Qty: 90 TABLET | Refills: 1 | Status: SHIPPED | OUTPATIENT
Start: 2021-10-11 | End: 2022-04-10 | Stop reason: SDUPTHER

## 2021-10-12 NOTE — TELEPHONE ENCOUNTER
Scheduled Ftsg Text: The defect edges were debeveled with a #15 scalpel blade.  Given the location of the defect, shape of the defect and the proximity to free margins a full thickness skin graft was deemed most appropriate.  Using a sterile surgical marker, the primary defect shape was transferred to the donor site. The area thus outlined was incised deep to adipose tissue with a #15 scalpel blade.  The harvested graft was then trimmed of adipose tissue until only dermis and epidermis was left.  The skin margins of the secondary defect were undermined to an appropriate distance in all directions utilizing iris scissors.  The secondary defect was closed with interrupted buried subcutaneous sutures.  The skin edges were then re-apposed with running  sutures.  The skin graft was then placed in the primary defect and oriented appropriately.

## 2021-10-25 ENCOUNTER — E-VISIT (OUTPATIENT)
Dept: INTERNAL MEDICINE CLINIC | Age: 56
End: 2021-10-25
Payer: COMMERCIAL

## 2021-10-25 DIAGNOSIS — J06.9 URI, ACUTE: Primary | ICD-10-CM

## 2021-10-25 PROCEDURE — 98971 NQHP OL DIG ASSMT&MGMT 11-20: CPT | Performed by: NURSE PRACTITIONER

## 2021-10-25 ASSESSMENT — LIFESTYLE VARIABLES: SMOKING_STATUS: NO, I'VE NEVER SMOKED

## 2021-10-25 NOTE — PROGRESS NOTES
See Evisit:   URI, acute  -     COVID-19; Future  Supportive care reviewed  Humidified air  Honey lemon tea  Nasal rinse prn  Flonase daily  NSAIDs/ tylenol for pain and fever  Mucinex prn for congestion   Consider abx pending covid test and/or symptom persistence   Electronically signed by DEEPAK Mares CNP on 10/25/2021 at 10:39 AM

## 2021-10-26 ENCOUNTER — PATIENT MESSAGE (OUTPATIENT)
Dept: INTERNAL MEDICINE CLINIC | Age: 56
End: 2021-10-26

## 2021-10-26 NOTE — TELEPHONE ENCOUNTER
From: Andrae Sanon  To: DEEPAK Merino - CNP  Sent: 10/26/2021 9:36 AM EDT  Subject: Visit Follow-Up Question    I have had covid! This is ear issues that Ivrosalina had on and off all my life! Im going to be flying on an airplane in 2 weeks and I rarely fly because of ear issues and I know if Im having pain now the issue is going to escalate, I really dont want to have a miserable vacation!

## 2021-11-22 DIAGNOSIS — I10 ESSENTIAL HYPERTENSION, BENIGN: ICD-10-CM

## 2021-11-22 RX ORDER — LISINOPRIL 20 MG/1
TABLET ORAL
Qty: 90 TABLET | Refills: 0 | Status: SHIPPED | OUTPATIENT
Start: 2021-11-22 | End: 2022-02-24

## 2021-12-06 ENCOUNTER — TELEPHONE (OUTPATIENT)
Dept: INTERNAL MEDICINE CLINIC | Age: 56
End: 2021-12-06

## 2021-12-06 NOTE — TELEPHONE ENCOUNTER
Pt calling needs referral to see Dr Amelia Viveros let me know when there is one and I will call and set up her appt. Thanks.

## 2021-12-06 NOTE — TELEPHONE ENCOUNTER
Pt needs an appt to discuss her mood to determine appropriate course. I am not seeing any visits other than acute visits for pain / fatigue in the past year.   Guerline Majano

## 2021-12-13 ENCOUNTER — OFFICE VISIT (OUTPATIENT)
Dept: INTERNAL MEDICINE CLINIC | Age: 56
End: 2021-12-13
Payer: COMMERCIAL

## 2021-12-13 VITALS
HEIGHT: 63 IN | WEIGHT: 195 LBS | OXYGEN SATURATION: 100 % | DIASTOLIC BLOOD PRESSURE: 78 MMHG | SYSTOLIC BLOOD PRESSURE: 118 MMHG | BODY MASS INDEX: 34.55 KG/M2 | HEART RATE: 55 BPM

## 2021-12-13 DIAGNOSIS — E03.9 ACQUIRED HYPOTHYROIDISM: ICD-10-CM

## 2021-12-13 DIAGNOSIS — Z83.3 FAMILY HISTORY OF DIABETES MELLITUS (DM): ICD-10-CM

## 2021-12-13 DIAGNOSIS — Z13.220 LIPID SCREENING: ICD-10-CM

## 2021-12-13 DIAGNOSIS — R79.89 ELEVATED LIVER FUNCTION TESTS: ICD-10-CM

## 2021-12-13 DIAGNOSIS — I10 ESSENTIAL HYPERTENSION, BENIGN: ICD-10-CM

## 2021-12-13 DIAGNOSIS — F32.A DEPRESSION, UNSPECIFIED DEPRESSION TYPE: ICD-10-CM

## 2021-12-13 DIAGNOSIS — F41.9 ANXIETY: Primary | ICD-10-CM

## 2021-12-13 PROCEDURE — 99214 OFFICE O/P EST MOD 30 MIN: CPT | Performed by: NURSE PRACTITIONER

## 2021-12-13 RX ORDER — FLUOXETINE 20 MG/1
20 TABLET, FILM COATED ORAL DAILY
Qty: 30 TABLET | Refills: 1 | Status: SHIPPED | OUTPATIENT
Start: 2021-12-13 | End: 2022-01-24 | Stop reason: SDUPTHER

## 2021-12-13 RX ORDER — AZELASTINE 1 MG/ML
1 SPRAY, METERED NASAL 2 TIMES DAILY
COMMUNITY
Start: 2021-12-07 | End: 2022-08-10

## 2021-12-13 RX ORDER — FLUTICASONE PROPIONATE 50 MCG
1 SPRAY, SUSPENSION (ML) NASAL DAILY
COMMUNITY
Start: 2021-12-07

## 2021-12-13 NOTE — PROGRESS NOTES
12/13/21     Chief Complaint   Patient presents with    Referral - General     pysch - mood f/u     HPI     Pt is here for depression, anxiety and mood. She is requesting a referral to see psychology. Reports worsening the past 6 months. With her increased pain and medical concerns she feels that she needs to speak to someone for better coping skills. She was on duloxetine for fibromyalgia - she has been off since Sept. She was having some s/e (dizziness/ fogginess) when taking the medication, this was stopped by  as she is entering a fibro study. Is taking tylenol and ibuprofen. She had been on prozac for about 20 years and did well on it. Daughter has anxiety so cannot talk to her.  is not understanding. Allergies   Allergen Reactions    Morphine Itching     Current Outpatient Medications   Medication Sig Dispense Refill    FLUoxetine (PROZAC) 20 MG tablet Take 1 tablet by mouth daily 30 tablet 1    lisinopril (PRINIVIL;ZESTRIL) 20 MG tablet TAKE ONE TABLET BY MOUTH DAILY 90 tablet 0    hydroCHLOROthiazide (HYDRODIURIL) 25 MG tablet TAKE ONE TABLET BY MOUTH DAILY 90 tablet 1    levothyroxine (SYNTHROID) 50 MCG tablet TAKE ONE TABLET BY MOUTH DAILY 90 tablet 1    azelastine (ASTELIN) 0.1 % nasal spray       fluticasone (FLONASE) 50 MCG/ACT nasal spray       fluocinonide (LIDEX) 0.05 % cream Apply topically 2 times daily      omeprazole (PRILOSEC) 20 MG delayed release capsule Take 20 mg by mouth daily       No current facility-administered medications for this visit. Review of Systems  Negative other than HPI     Vitals:    12/13/21 1323   BP: 118/78   Pulse: 55   SpO2: 100%   Weight: 195 lb (88.5 kg)   Height: 5' 3\" (1.6 m)      Physical Exam  Constitutional:       General: She is not in acute distress. Appearance: Normal appearance. She is not ill-appearing. HENT:      Head: Normocephalic and atraumatic.    Cardiovascular:      Rate and Rhythm: Normal rate and regular rhythm. Pulmonary:      Effort: Pulmonary effort is normal. No respiratory distress. Breath sounds: Normal breath sounds. Skin:     General: Skin is warm. Neurological:      General: No focal deficit present. Mental Status: She is alert and oriented to person, place, and time. Mental status is at baseline. Psychiatric:         Mood and Affect: Mood normal. Affect is tearful. Behavior: Behavior normal.       Assessment/Plan:  Anxiety/ Depression, unspecified depression type  Uncontrolled  Discussed options in detail   Restarting fluoxetine as she has done well on this in the past.   Referral to see Bruna Farr and/or Dr. Alyssa Robledo.   - FLUoxetine (PROZAC) 20 MG tablet; Take 1 tablet by mouth daily  Dispense: 30 tablet; Refill: 1  - Ambulatory referral to Psychology    Acquired hypothyroidism  Well controlled, continue current regimen. Due for labs   - TSH WITH REFLEX TO FT4; Future    Essential hypertension, benign/ Elevated liver function tests/ Family history of diabetes mellitus (DM)/ Lipid screening  Well controlled, continue current regimen. Due for labs   - Comprehensive Metabolic Panel; Future  - Hemoglobin A1C; Future  - Lipid Panel; Future  - CBC; Future    Discussed medications with patient, who voiced understanding of their use and indications. All questions answered. Return if symptoms worsen or fail to improve.       Electronically signed by DEEPAK Keller CNP on 12/13/2021 at 1:48 PM

## 2021-12-14 ENCOUNTER — PATIENT MESSAGE (OUTPATIENT)
Dept: INTERNAL MEDICINE CLINIC | Age: 56
End: 2021-12-14

## 2021-12-14 NOTE — TELEPHONE ENCOUNTER
From: Margy Blackman  To: Radha Williamson  Sent: 12/14/2021 9:25 AM EST  Subject: Mri referral     Ravi Avendaño I forgot to ask you about getting MRI I want to have my neck and my hips checked, two major sources of my pain.  Thank you

## 2021-12-22 ENCOUNTER — OFFICE VISIT (OUTPATIENT)
Dept: PSYCHOLOGY | Age: 56
End: 2021-12-22
Payer: COMMERCIAL

## 2021-12-22 DIAGNOSIS — F41.1 GAD (GENERALIZED ANXIETY DISORDER): ICD-10-CM

## 2021-12-22 DIAGNOSIS — F33.1 MODERATE EPISODE OF RECURRENT MAJOR DEPRESSIVE DISORDER (HCC): Primary | ICD-10-CM

## 2021-12-22 PROCEDURE — 90791 PSYCH DIAGNOSTIC EVALUATION: CPT | Performed by: PSYCHOLOGIST

## 2021-12-22 ASSESSMENT — PATIENT HEALTH QUESTIONNAIRE - PHQ9
1. LITTLE INTEREST OR PLEASURE IN DOING THINGS: 3
7. TROUBLE CONCENTRATING ON THINGS, SUCH AS READING THE NEWSPAPER OR WATCHING TELEVISION: 3
8. MOVING OR SPEAKING SO SLOWLY THAT OTHER PEOPLE COULD HAVE NOTICED. OR THE OPPOSITE, BEING SO FIGETY OR RESTLESS THAT YOU HAVE BEEN MOVING AROUND A LOT MORE THAN USUAL: 1
9. THOUGHTS THAT YOU WOULD BE BETTER OFF DEAD, OR OF HURTING YOURSELF: 0
5. POOR APPETITE OR OVEREATING: 2
SUM OF ALL RESPONSES TO PHQ QUESTIONS 1-9: 18
SUM OF ALL RESPONSES TO PHQ QUESTIONS 1-9: 18
10. IF YOU CHECKED OFF ANY PROBLEMS, HOW DIFFICULT HAVE THESE PROBLEMS MADE IT FOR YOU TO DO YOUR WORK, TAKE CARE OF THINGS AT HOME, OR GET ALONG WITH OTHER PEOPLE: 3
4. FEELING TIRED OR HAVING LITTLE ENERGY: 3
3. TROUBLE FALLING OR STAYING ASLEEP: 3
2. FEELING DOWN, DEPRESSED OR HOPELESS: 2
SUM OF ALL RESPONSES TO PHQ QUESTIONS 1-9: 18
SUM OF ALL RESPONSES TO PHQ9 QUESTIONS 1 & 2: 5
6. FEELING BAD ABOUT YOURSELF - OR THAT YOU ARE A FAILURE OR HAVE LET YOURSELF OR YOUR FAMILY DOWN: 1

## 2021-12-22 NOTE — PROGRESS NOTES
Behavioral Health Consultation  Natty Grande M.A. Behavioral Health Consultant   Psychology Trainee  Greg Kim, Ph.D.  Psychology Supervisor  12/22/2021  2:59 PM      Time spent with Patient: 27 minutes  This is patient's first [facility] appointment. Reason for Consult: Depression and anxiety    Pt provided informed consent for the behavioral health program. Discussed with patient model of service to include the limits of confidentiality (i.e. abuse reporting, suicide intervention, etc.) and short-term intervention focused approach. Pt indicated understanding. Discussed the nature of supervision; pt consented and signed supervision consent form. Feedback given to PCP. S:  Pt seen per PCP re: Depression and anxiety    Patient reported depressive symptoms, including depressed mood, deactivation, poor self-worth, social isolation, insomnia (difficulties with onset and maintenance, usually wake 2x/night due to going to bathroom, about 30 minutes before re-onset), fatigue, and poor concentration/focus (Pt reported concentration and memory are getting worse). Symptoms have been present for about 2 years and are exacerbated by pain. Pt reported symptoms of anxiety, including anxious, racing, uncontrollable thoughts, muscle tension (shoulders/back), restlessness, insomnia, fatigue, irritability, and poor concentration/focus. Pt reported that she first had \"anxiety attacks\" about 25 years ago but she has been able to suppress anxiety attacks as she has gotten older. Pt reported having a considerable number of medical issues, which have resulted in her depression and anxiety worsening over time. Pt indicated that she has fibromyalgia, spinal stenosis, neck issues, issues with her hips, and numbness in her legs. She noted, \"I'm always in pain and hurting and I don't have anyone to talk to. \" Pt also indicated that she formerly took Prozac for 20 years but stopped about 2 years ago.  Since she stopped, she feels like she has been able to experience and identify her emotions better. Current living situation: Pt currently lives in a house with her  and doglive in a house with her and  and dog. Pt stated, \"things at home are fine other than my  being narcissistic. \" She noted that her relationship with her  is somewhat problematic, as she feels there is a lack of conversation between them. Pt has one daughter, a son-in-law, and a 15year old grandson who she is close with. Pt is the youngest of 10 sisters (2 ) and she communicates with her siblings via text message and sees them yearly. Additionally, pt reports having several friends who live scattered around the country, but she feels she is able to maintain her relationships via text messaging. 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 She greatly enjoys her work, but she is only able to work 3x/week due to her medical issues and pain. Interests: read, playing on phone. Caffeine: 1-2 cups of coffee/day.      O:  MSE:    Appearance    alert, cooperative  Impulsive behavior No  Speech    spontaneous, normal rate and normal volume  Mood    Depressed  Affect    normal affect  Thought Content    intact  Thought Process    linear and coherent  Associations    logical connections  Insight    Fair  Judgment    Intact  Orientation    oriented to person, place, time, and general circumstances  Memory    recent and remote memory intact  Attention/Concentration    intact  Morbid ideation No  Suicide Assessment    no suicidal ideation    History:    Social History:   Social History     Socioeconomic History    Marital status:      Spouse name: Not on file    Number of children: Not on file    Years of education: Not on file    Highest education level: Not on file   Occupational History    Not on file   Tobacco Use    Smoking status: Former Smoker     Packs/day: 1.00     Years: 7.00     Pack years: 7.00     Types: Cigarettes     Quit date: 1985     Years since quittin.9    Smokeless tobacco: Never Used   Substance and Sexual Activity    Alcohol use: Yes     Comment: socially on occassion    Drug use: Not on file    Sexual activity: Yes     Partners: Male     Comment:    Other Topics Concern    Not on file   Social History Narrative    Not on file     Social Determinants of Health     Financial Resource Strain: Low Risk     Difficulty of Paying Living Expenses: Not hard at all   Food Insecurity: No Food Insecurity    Worried About [address] in the Last Year: Never true    [facility] in the Last Year: Never true   Transportation Needs:     Lack of Transportation (Medical): Not on file    Lack of Transportation (Non-Medical): Not on file   Physical Activity:     Days of Exercise per Week: Not on file    Minutes of Exercise per Session: Not on file   Stress:     Feeling of Stress : Not on file   Social Connections:     Frequency of Communication with Friends and Family: Not on file    Frequency of Social Gatherings with Friends and Family: Not on file    Attends Restorationist Services: Not on file    Active Member of [address] [place] [address] or Organizations: Not on file    Attends Club or Organization Meetings: Not on file    Marital Status: Not on file   Intimate Partner Violence:     Fear of Current or Ex-Partner: Not on file    Emotionally Abused: Not on file    Physically Abused: Not on file    Sexually Abused: Not on file   Housing Stability:     Unable to Pay for Housing in the Last Year: Not on file    Number of [place] in the Last Year: Not on file    Unstable Housing in the Last Year: Not on file     TOBACCO:   reports that she quit smoking about 36 years ago. Her smoking use included cigarettes. She has a 7.00 pack-year smoking history. She has never used smokeless tobacco.   ETOH:   reports current alcohol use. Pt reports consuming 1 hank about every three months.   Drugs: Denied    A:  Administered PHQ-9 (see below). Patient endorses moderately severe symptoms of depression. Pt denied SI/HI.    PHQ Scores 12/22/2021 7/12/2021 9/21/2020 11/4/2019 8/27/2018 5/25/2017   PHQ2 Score 5 0 0 1 0 0   PHQ9 Score 18 0 0 1 0 0     Interpretation of Total Score Depression Severity: 1-4 = Minimal depression, 5-9 = Mild depression, 10-14 = Moderate depression, 15-19 = Moderately severe depression, 20-27 = Severe depression      Diagnosis:  Major depressive disorder; recurrent and moderate  Generalized anxiety disorder (MAXIMO)    Plan:  Pt interventions:  Established rapport, Conducted functional assessment, [place]-setting to identify pt's primary goals for [facility] visit / overall health and Supportive techniques

## 2022-01-13 RX ORDER — IBUPROFEN 800 MG/1
TABLET ORAL
Qty: 120 TABLET | Refills: 0 | Status: SHIPPED | OUTPATIENT
Start: 2022-01-13 | End: 2022-05-10 | Stop reason: ALTCHOICE

## 2022-01-19 ENCOUNTER — VIRTUAL VISIT (OUTPATIENT)
Dept: PSYCHOLOGY | Age: 57
End: 2022-01-19
Payer: COMMERCIAL

## 2022-01-19 DIAGNOSIS — F41.1 GAD (GENERALIZED ANXIETY DISORDER): ICD-10-CM

## 2022-01-19 DIAGNOSIS — F33.1 MODERATE EPISODE OF RECURRENT MAJOR DEPRESSIVE DISORDER (HCC): Primary | ICD-10-CM

## 2022-01-19 PROCEDURE — 90832 PSYTX W PT 30 MINUTES: CPT | Performed by: PSYCHOLOGIST

## 2022-01-24 ENCOUNTER — OFFICE VISIT (OUTPATIENT)
Dept: INTERNAL MEDICINE CLINIC | Age: 57
End: 2022-01-24
Payer: COMMERCIAL

## 2022-01-24 VITALS
HEART RATE: 72 BPM | WEIGHT: 192 LBS | BODY MASS INDEX: 34.01 KG/M2 | SYSTOLIC BLOOD PRESSURE: 126 MMHG | DIASTOLIC BLOOD PRESSURE: 84 MMHG

## 2022-01-24 DIAGNOSIS — F32.A DEPRESSION, UNSPECIFIED DEPRESSION TYPE: ICD-10-CM

## 2022-01-24 DIAGNOSIS — I10 ESSENTIAL HYPERTENSION, BENIGN: ICD-10-CM

## 2022-01-24 DIAGNOSIS — M48.061 SPINAL STENOSIS OF LUMBAR REGION WITHOUT NEUROGENIC CLAUDICATION: Primary | ICD-10-CM

## 2022-01-24 DIAGNOSIS — M48.062 PSEUDOCLAUDICATION SYNDROME: ICD-10-CM

## 2022-01-24 DIAGNOSIS — F41.9 ANXIETY: ICD-10-CM

## 2022-01-24 DIAGNOSIS — Z83.3 FAMILY HISTORY OF DIABETES MELLITUS (DM): ICD-10-CM

## 2022-01-24 DIAGNOSIS — M54.2 MUSCULOSKELETAL NECK PAIN: ICD-10-CM

## 2022-01-24 DIAGNOSIS — E03.9 ACQUIRED HYPOTHYROIDISM: ICD-10-CM

## 2022-01-24 DIAGNOSIS — M25.50 MULTIPLE JOINT PAIN: ICD-10-CM

## 2022-01-24 DIAGNOSIS — R79.89 ELEVATED LIVER FUNCTION TESTS: ICD-10-CM

## 2022-01-24 DIAGNOSIS — Z13.220 LIPID SCREENING: ICD-10-CM

## 2022-01-24 DIAGNOSIS — M25.551 RIGHT HIP PAIN: ICD-10-CM

## 2022-01-24 LAB
A/G RATIO: 1.5 (ref 1.1–2.2)
ALBUMIN SERPL-MCNC: 4.3 G/DL (ref 3.4–5)
ALP BLD-CCNC: 90 U/L (ref 40–129)
ALT SERPL-CCNC: 11 U/L (ref 10–40)
ANION GAP SERPL CALCULATED.3IONS-SCNC: 12 MMOL/L (ref 3–16)
AST SERPL-CCNC: 14 U/L (ref 15–37)
BILIRUB SERPL-MCNC: 0.3 MG/DL (ref 0–1)
BUN BLDV-MCNC: 17 MG/DL (ref 7–20)
CALCIUM SERPL-MCNC: 11 MG/DL (ref 8.3–10.6)
CHLORIDE BLD-SCNC: 100 MMOL/L (ref 99–110)
CHOLESTEROL, TOTAL: 146 MG/DL (ref 0–199)
CO2: 29 MMOL/L (ref 21–32)
CREAT SERPL-MCNC: 0.8 MG/DL (ref 0.6–1.1)
GFR AFRICAN AMERICAN: >60
GFR NON-AFRICAN AMERICAN: >60
GLUCOSE BLD-MCNC: 95 MG/DL (ref 70–99)
HCT VFR BLD CALC: 40.4 % (ref 36–48)
HDLC SERPL-MCNC: 54 MG/DL (ref 40–60)
HEMOGLOBIN: 13.5 G/DL (ref 12–16)
LDL CHOLESTEROL CALCULATED: 60 MG/DL
MCH RBC QN AUTO: 29.5 PG (ref 26–34)
MCHC RBC AUTO-ENTMCNC: 33.4 G/DL (ref 31–36)
MCV RBC AUTO: 88.2 FL (ref 80–100)
PDW BLD-RTO: 13.2 % (ref 12.4–15.4)
PLATELET # BLD: 242 K/UL (ref 135–450)
PMV BLD AUTO: 8.7 FL (ref 5–10.5)
POTASSIUM SERPL-SCNC: 4.3 MMOL/L (ref 3.5–5.1)
RBC # BLD: 4.58 M/UL (ref 4–5.2)
SODIUM BLD-SCNC: 141 MMOL/L (ref 136–145)
TOTAL PROTEIN: 7.1 G/DL (ref 6.4–8.2)
TRIGL SERPL-MCNC: 161 MG/DL (ref 0–150)
TSH REFLEX FT4: 1.13 UIU/ML (ref 0.27–4.2)
VLDLC SERPL CALC-MCNC: 32 MG/DL
WBC # BLD: 5.7 K/UL (ref 4–11)

## 2022-01-24 PROCEDURE — 99214 OFFICE O/P EST MOD 30 MIN: CPT | Performed by: NURSE PRACTITIONER

## 2022-01-24 RX ORDER — FLUOXETINE 20 MG/1
20 TABLET, FILM COATED ORAL DAILY
Qty: 90 TABLET | Refills: 3 | Status: SHIPPED | OUTPATIENT
Start: 2022-01-24

## 2022-01-24 NOTE — PROGRESS NOTES
1/24/22     Chief Complaint   Patient presents with    Hypertension     just did blood work when she came in today    [address]     6 week f/u      HPI    Here for HTN follow up - blood work complete today, not yet resulted. Here for mood follow up. Recently restarted fluoxetine and this is going well. She has established with [facility] Fern[facility] Daigle and this is going well. Feels dose is good on fluoxetine. Having chronic lower back and right hip pain for the past 2 years. Feels like it is worsening. Right hip pain has been ongoing. Having trouble sitting and laying on right side due to pain. She did not complete PT as recommended. MRI lumbar 12/16/2020 noted moderate stenosis L2-3, L3-4, L4-5    Today she also complains of ongoing and intermittent neck pain the past 5- 6 months. States more frequent the past few weeks. She denies any numbness or tingling, headaches or arm weakness. Her fibromyalgia causes all over pain and stiffness. Allergies   Allergen Reactions    Morphine Itching     Current Outpatient Medications   Medication Sig Dispense Refill    FLUoxetine (PROZAC) 20 MG tablet Take 1 tablet by mouth daily 90 tablet 3    ibuprofen (ADVIL;MOTRIN) 800 MG tablet TAKE ONE TABLET BY MOUTH EVERY 6 HOURS AS NEEDED FOR PAIN 120 tablet 0    azelastine (ASTELIN) 0.1 % nasal spray       fluticasone (FLONASE) 50 MCG/ACT nasal spray       lisinopril (PRINIVIL;ZESTRIL) 20 MG tablet TAKE ONE TABLET BY MOUTH DAILY 90 tablet 0    hydroCHLOROthiazide (HYDRODIURIL) 25 MG tablet TAKE ONE TABLET BY MOUTH DAILY 90 tablet 1    levothyroxine (SYNTHROID) 50 MCG tablet TAKE ONE TABLET BY MOUTH DAILY 90 tablet 1    fluocinonide (LIDEX) 0.05 % cream Apply topically 2 times daily      omeprazole (PRILOSEC) 20 MG delayed release capsule Take 20 mg by mouth daily       No current facility-administered medications for this visit.      Review of Systems  Negative other than HPI    Vitals:    01/24/22 1042   BP: 126/84 Pulse: 72   Weight: 192 lb (87.1 kg)      Physical Exam  Constitutional:       General: She is not in acute distress. Appearance: Normal appearance. She is not ill-appearing. HENT:      Head: Normocephalic and atraumatic. Pulmonary:      Effort: Pulmonary effort is normal. No respiratory distress. Musculoskeletal:      Cervical back: No swelling, deformity or spasms. Lumbar back: No deformity or tenderness. Normal range of motion. Neurological:      General: No focal deficit present. Mental Status: She is alert and oriented to person, place, and time. Mental status is at baseline. Psychiatric:         Mood and Affect: Mood normal.         Behavior: Behavior normal.       Assessment/Plan:  Spinal stenosis of lumbar region without neurogenic claudication/ Pseudoclaudication syndrome/ Multiple joint pain/ Right hip pain usculoskeletal neck pain  Uncontrolled. Reviewed previous MRI with pt in detail. Did not complete formal PT - encouraged, new referral provided. Checking xray of lumbar spine and right hip today   Cervical neck pain consistent with MSK - checking xray and encouraged PT   Continue with OTC regimen prn   Use ice/ heat   - XR LUMBAR SPINE (2-3 VIEWS); Future  - XR right hip    - XR CERVICAL SPINE (2-3 VIEWS); Future  - Ambulatory referral to Physical Therapy    Anxiety/ Depression, unspecified depression type  Well controlled, continue current regimen.      - FLUoxetine (PROZAC) 20 MG tablet; Take 1 tablet by mouth daily  Dispense: 90 tablet; Refill: 3    Discussed medications with patient, who voiced understanding of their use and indications. All questions answered.     FU in 6 months with PCP for CC and prn     Electronically signed by DEEPAK Reno CNP on 1/24/2022 at 12:06 PM

## 2022-01-25 LAB
ESTIMATED AVERAGE GLUCOSE: 119.8 MG/DL
HBA1C MFR BLD: 5.8 %

## 2022-01-26 DIAGNOSIS — E83.52 HYPERCALCEMIA: Primary | ICD-10-CM

## 2022-02-09 ENCOUNTER — OFFICE VISIT (OUTPATIENT)
Dept: PSYCHOLOGY | Age: 57
End: 2022-02-09
Payer: COMMERCIAL

## 2022-02-09 DIAGNOSIS — F33.1 MODERATE EPISODE OF RECURRENT MAJOR DEPRESSIVE DISORDER (HCC): Primary | ICD-10-CM

## 2022-02-09 DIAGNOSIS — F41.1 GAD (GENERALIZED ANXIETY DISORDER): ICD-10-CM

## 2022-02-09 PROCEDURE — 90832 PSYTX W PT 30 MINUTES: CPT | Performed by: PSYCHOLOGIST

## 2022-02-09 NOTE — PROGRESS NOTES
Behavioral Health Consultation  Delano Vance M.A. Behavioral Health Consultant  Psychology Trainee  Galileo Lyle, Ph.D.  Psychology Supervisor  2022  3:00 PM      Time spent with Patient: 30 minutes  This is patient's third  [facility] appointment. Reason for Consult:    Chief Complaint   Patient presents with    Depression    Anxiety       Feedback given to PCP. S:  Pt seen for f/u of depression and anxiety. Pt reported variable mood and sxs noting that the cold weather has worsened her depression due to not leaving the house as frequently. Discussed pt's anxiety regarding work; identified pros and cons to long term. Developed plan for pt to tell clients she is retiring in May; problem solved barriers.      O:  MSE:    Appearance    alert, cooperative  Appetite normal  Sleep disturbance Yes  Fatigue Yes  Loss of pleasure No  Impulsive behavior No  Speech    spontaneous, normal rate, normal volume and well articulated  Mood    Anxious  Affect    normal affect  Thought Content    intact  Thought Process    linear and coherent  Associations    logical connections  Insight    Fair  Judgment    Intact  Orientation    oriented to person, place, time, and general circumstances  Memory    recent and remote memory intact  Attention/Concentration    intact  Morbid ideation No  Suicide Assessment    no suicidal ideation    History:  Social History:   Social History     Socioeconomic History    Marital status:      Spouse name: Not on file    Number of children: Not on file    Years of education: Not on file    Highest education level: Not on file   Occupational History    Not on file   Tobacco Use    Smoking status: Former Smoker     Packs/day: 1.00     Years: 7.00     Pack years: 7.00     Types: Cigarettes     Quit date: 1985     Years since quittin.1    Smokeless tobacco: Never Used   Substance and Sexual Activity    Alcohol use: Yes     Comment: socially on occassion    Drug use: Not on file    Sexual activity: Yes     Partners: Male     Comment:    Other Topics Concern    Not on file   Social History Narrative    Not on file     Social Determinants of Health     Financial Resource Strain: Low Risk     Difficulty of Paying Living Expenses: Not hard at all   Food Insecurity: No Food Insecurity    Worried About Running Out of Food in the Last Year: Never true    [address] in the Last Year: Never true   Transportation Needs:     Lack of Transportation (Medical): Not on file    Lack of Transportation (Non-Medical): Not on file   Physical Activity:     Days of Exercise per Week: Not on file    Minutes of Exercise per Session: Not on file   Stress:     Feeling of Stress : Not on file   Social Connections:     Frequency of Communication with Friends and Family: Not on file    Frequency of Social Gatherings with Friends and Family: Not on file    Attends Caodaism Services: Not on file    Active Member of [address] [place] [address] or Organizations: Not on file    Attends Club or Organization Meetings: Not on file    Marital Status: Not on file   Intimate Partner Violence:     Fear of Current or Ex-Partner: Not on file    Emotionally Abused: Not on file    Physically Abused: Not on file    Sexually Abused: Not on file   Housing Stability:     Unable to Pay for Housing in the Last Year: Not on file    Number of [place] in the Last Year: Not on file    Unstable Housing in the Last Year: Not on file     TOBACCO:   reports that she quit smoking about 37 years ago. Her smoking use included cigarettes. She has a 7.00 pack-year smoking history. She has never used smokeless tobacco.  ETOH:   reports current alcohol use. A:  PHQ-9 was not administered at this time.      PHQ Scores 12/22/2021 7/12/2021 9/21/2020 11/4/2019 8/27/2018 5/25/2017   PHQ2 Score 5 0 0 1 0 0   PHQ9 Score 18 0 0 1 0 0     Interpretation of Total Score Depression Severity: 1-4 = Minimal depression, 5-9 = Mild depression, 10-14 = Moderate depression, 15-19 = Moderately severe depression, 20-27 = Severe depression        Diagnosis:  1. Moderate episode of recurrent major depressive disorder ([address]    2.  MAXIMO (generalized anxiety disorder)          Plan:  Pt interventions:  [place]-setting to identify pt's primary goals for [facility] visit / overall health and Supportive techniques and pros and cons consistent with CBT

## 2022-02-16 ENCOUNTER — HOSPITAL ENCOUNTER (OUTPATIENT)
Dept: PHYSICAL THERAPY | Age: 57
Setting detail: THERAPIES SERIES
Discharge: HOME | End: 2022-02-16

## 2022-02-16 NOTE — PROGRESS NOTES
Physical Therapy  Cancellation/No-show Note  Patient Name:  Ilene Primrose  :  1965   Date:  2022  Cancelled visits to date: 1 eval   No-shows to date: 0    Patient status for today's appointment patient:  [x]  Cancelled -Eval  [x]  Rescheduled appointment   []  No-show     Reason given by patient:  []  Patient ill  []  Conflicting appointment  []  No transportation    []  Conflict with work  [x]  No reason given  []  Other:     Comments:      Phone call information:   []  Phone call made today to patient at _ time at number provided:      []  Patient answered, conversation as follows:    []  Patient did not answer, message left as follows:  [x]  Phone call not made today Pt RS to 22  []  Phone call not needed - pt contacted us to cancel and provided reason for cancellation.      Electronically signed by:  Kartik Rodriguez, PT, DPT

## 2022-02-21 ENCOUNTER — PATIENT MESSAGE (OUTPATIENT)
Dept: INTERNAL MEDICINE CLINIC | Age: 57
End: 2022-02-21

## 2022-02-22 RX ORDER — TRIAMCINOLONE ACETONIDE 1 MG/G
CREAM TOPICAL
Qty: 80 G | Refills: 3 | Status: SHIPPED | OUTPATIENT
Start: 2022-02-22

## 2022-02-22 NOTE — TELEPHONE ENCOUNTER
From: Man Orellana  To: David Diana  Sent: 2/21/2022 5:19 PM EST  Subject: Prescription refill     I need a refill for triamcinolone cream! Called into [address] [place] [address] please!

## 2022-02-24 DIAGNOSIS — I10 ESSENTIAL HYPERTENSION, BENIGN: ICD-10-CM

## 2022-02-24 RX ORDER — LISINOPRIL 20 MG/1
TABLET ORAL
Qty: 90 TABLET | Refills: 1 | Status: SHIPPED | OUTPATIENT
Start: 2022-02-24 | End: 2022-10-22 | Stop reason: SDUPTHER

## 2022-03-16 ENCOUNTER — PATIENT MESSAGE (OUTPATIENT)
Dept: INTERNAL MEDICINE CLINIC | Age: 57
End: 2022-03-16

## 2022-03-16 ENCOUNTER — OFFICE VISIT (OUTPATIENT)
Dept: PSYCHOLOGY | Age: 57
End: 2022-03-16
Payer: COMMERCIAL

## 2022-03-16 DIAGNOSIS — E83.52 HYPERCALCEMIA: Primary | ICD-10-CM

## 2022-03-16 DIAGNOSIS — E83.52 HYPERCALCEMIA: ICD-10-CM

## 2022-03-16 DIAGNOSIS — F33.1 MODERATE EPISODE OF RECURRENT MAJOR DEPRESSIVE DISORDER (HCC): Primary | ICD-10-CM

## 2022-03-16 DIAGNOSIS — F41.1 GAD (GENERALIZED ANXIETY DISORDER): ICD-10-CM

## 2022-03-16 LAB
CALCIUM SERPL-MCNC: 11.1 MG/DL (ref 8.3–10.6)
PARATHYROID HORMONE INTACT: 103.8 PG/ML (ref 14–72)
VITAMIN D 25-HYDROXY: 40.5 NG/ML

## 2022-03-16 PROCEDURE — 90832 PSYTX W PT 30 MINUTES: CPT | Performed by: PSYCHOLOGIST

## 2022-03-16 NOTE — PATIENT INSTRUCTIONS
Sleep Hygiene Guidelines    Good dental hygiene is important in determining the health of your teeth and gums. We all know we are supposed to brush and floss regularly. Those who do so are more likely to have strong, healthy gums and less cavities. Similarly, good sleep hygiene is important in determining the quality and quantity of your sleep. Below are guidelines for good sleep hygiene practices. Review these guidelines and evaluate how well you practice good sleep hygiene. Caffeine:  Avoid Caffeine 6-8 Hours Before Bedtime       Caffeine disturbs sleep, even in people who do not think they experience a stimulation effect. Individuals with insomnia are often more sensitive to mild stimulants than are normal sleepers. Caffeine is found in items such as coffee, tea, soda, chocolate, and many over-the-counter medications (e.g., Excedrin). Thus, drinking caffeinated beverages should be avoided near bedtime and during the night. You might consider a trial period of no caffeine if you tend to be sensitive to its effects. Nicotine:  Avoid Nicotine Before Bedtime       Although some smokers claim that smoking helps them relax, but nicotine is a stimulant. The initial relaxing effects occur with the initial entry of the nicotine, but as the nicotine builds in the system it produces an effect similar to caffeine. Thus, smoking, dipping, or chewing tobacco should be avoided near bedtime and during the night. Dont smoke to get yourself back to sleep. Alcohol:  Avoid Alcohol After Dinner       Alcohol often promotes the onset of sleep, but as alcohol is metabolized sleep becomes disturbed and fragmented. Thus, a large amount of alcohol is a poor sleep aid and should not be used as such. Limit alcohol use to small quantities to moderate quantities.     Sleeping Pills:  Sleep Medications are Effective Only Temporarily       Scientists have shown that sleep medications lose their effectiveness in about 2 - 4 weeks when taken regularly. Despite advertisements to the contrary, over-the-counter sleeping aids have little impact on sleep beyond the placebo effect. Over time, sleeping pills actually can make sleep problems worse. When sleeping pills have been used for a long period, withdrawal from the medication can lead to an insomnia rebound. Thus, after long-term use, many individuals incorrectly conclude that they need sleeping pills in order to sleep normally. Keep use of sleep pills infrequent, but dont worry if you need t use one on an occasional basis. Regular Exercise       Get regular exercise, preferably 40 minutes each day of an activity that causes sweating. .  Exercise in the late afternoon or early evening seems to aid sleep, although the positive effect often takes several weeks to become noticeable. Exercising sporadically is not likely to improve sleep, and exercise within 2 hours of bedtime may elevate nervous system activity and interfere with sleep onset. Hot Baths  Spending 20 minutes in a tub of hot water an hour or two prior to bedtime may promote sleep and is strongly recommended. Bedroom Environment: Moderate Temperature, Quiet, and Dark       Extremes of heat or cold can disrupt sleep. A quiet environment is more sleep promoting than a noisy one. Noises can be masked with background white noise (such as the noise of a fan) or with earplugs. Bedrooms may be darkened with black-out shades or sleep masks can be worn. Position clocks out-of-sight since clock-watching can increase worry about the effects of lack of sleep. Be sure your mattress is not too soft or too firm and that your pillow is the right height and firmness. Eating       A light bedtime snack, such a glass of warm milk, cheese, or a bowl of cereal can promote sleep.   You should avoid the following foods at bedtime:  any caffeinated foods (e.g., chocolate), peanuts, beans, most raw fruits and vegetables (since they may cause gas), and high-fat foods such as potato chips or corn chips. Avoid snacks in the middle of the nights since awakening may become associated with hunger. If you have trouble with regurgitation, be especially careful to avid heavy meals and spices in the evening. Do not go to bed too hungry or too full. It may help to elevate you head with some pillows. Avoid Naps       Avoid naps, the sleep you obtain during the day takes away from you sleep need that night resulting in lighter, more restless sleep, difficulty falling asleep or early morning awakening. If you must nap, keep it brief, and take the nap about 8 hours after arising. It is best to set an alarm to ensure you dont sleep more than 10-15 minutes. Limit Your Time in Bed        Restrict your sleep period to the average number of hours you have actually slept per night during the preceding week. Quality of sleep is important. Too much time in bed can decrease the quality on subsequent night and contribute to the maintenance of existing sleep problems. Dont lay in bed for extended times not sleep. If you arent asleep in about 15-20 minutes go ahead and get up. Do something outside the bedroom that is relaxing. When you feel sleepy (i.e., yawning, head bobbing, eyes closing, concentration decreasing, then return to bed. Dont confuse tiredness with sleepiness, they are different. Tiredness doesnt lead to sleep, only sleepiness does. Regular Sleep Schedule       Keep a regular time each day, 7 days a week, to get out of bed. Keeping a regular awaking time helps set your circadian rhythm set so that your body learns to sleep at the desired time. Use the attached form to develop a plan for improving you sleep hygiene. It will take time for you sleep to get back in line so once you begin your sleep hygiene plan, stick with if for at least 6-8 weeks.       Planned Improvements of My Sleep Hygiene    Check

## 2022-03-16 NOTE — PROGRESS NOTES
Behavioral Health Consultation  Laurel Lombardo M.A. Behavioral Health Consultant  Psychology Trainee  Attila Villeda, Ph.D.  Psychology Supervisor  3/16/2022  2:17 PM      Time spent with Patient: 25 minutes  This is patient's fourth  [facility] appointment. Reason for Consult:    Chief Complaint   Patient presents with    Depression    Anxiety       Feedback given to PCP. S:  Pt seen for f/u of depression and anxiety. Pt reported improved mood and sxs noting that she quit her job last week, and found a new location to rent a chair at. Discussed sleep hygiene techniques and set a goal for walking 3x/week to and from work.      O:  MSE:    Appearance    alert, cooperative  Appetite normal  Sleep disturbance No  Fatigue No  Loss of pleasure No  Impulsive behavior No  Speech    spontaneous, normal rate and normal volume  Mood    Euthymic   Affect    normal affect  Thought Content    intact  Thought Process    linear and coherent  Associations    logical connections  Insight    Fair  Judgment    Intact  Orientation    oriented to person, place, time, and general circumstances  Memory    recent and remote memory intact  Attention/Concentration    intact  Morbid ideation No  Suicide Assessment    no suicidal ideation    History:  Social History:   Social History     Socioeconomic History    Marital status:      Spouse name: Not on file    Number of children: Not on file    Years of education: Not on file    Highest education level: Not on file   Occupational History    Not on file   Tobacco Use    Smoking status: Former Smoker     Packs/day: 1.00     Years: 7.00     Pack years: 7.00     Types: Cigarettes     Quit date: 1985     Years since quittin.2    Smokeless tobacco: Never Used   Substance and Sexual Activity    Alcohol use: Yes     Comment: socially on occassion    Drug use: Not on file    Sexual activity: Yes     Partners: Male     Comment:    Other Topics Concern    Not on file Social History Narrative    Not on file     Social Determinants of Health     Financial Resource Strain: Low Risk     Difficulty of Paying Living Expenses: Not hard at all   Food Insecurity: No Food Insecurity    Worried About Running Out of Food in the Last Year: Never true    [address] in the Last Year: Never true   Transportation Needs:     Lack of Transportation (Medical): Not on file    Lack of Transportation (Non-Medical): Not on file   Physical Activity:     Days of Exercise per Week: Not on file    Minutes of Exercise per Session: Not on file   Stress:     Feeling of Stress : Not on file   Social Connections:     Frequency of Communication with Friends and Family: Not on file    Frequency of Social Gatherings with Friends and Family: Not on file    Attends [employer] Services: Not on file    Active Member of [address] [place] [address] [employer] or Organizations: Not on file    Attends Club or Organization Meetings: Not on file    Marital Status: Not on file   Intimate Partner Violence:     Fear of Current or Ex-Partner: Not on file    Emotionally Abused: Not on file    Physically Abused: Not on file    Sexually Abused: Not on file   Housing Stability:     Unable to Pay for Housing in the Last Year: Not on file    Number of [place] in the Last Year: Not on file    Unstable Housing in the Last Year: Not on file     TOBACCO:   reports that she quit smoking about 37 years ago. Her smoking use included cigarettes. She has a 7.00 pack-year smoking history. She has never used smokeless tobacco.  ETOH:   reports current alcohol use. A:  PHQ-9 was not administered at this time.      PHQ Scores 12/22/2021 7/12/2021 9/21/2020 11/4/2019 8/27/2018 5/25/2017   PHQ2 Score 5 0 0 1 0 0   PHQ9 Score 18 0 0 1 0 0     Interpretation of Total Score Depression Severity: 1-4 = Minimal depression, 5-9 = Mild depression, 10-14 = Moderate depression, 15-19 = Moderately severe depression, 20-27 = Severe depression        Diagnosis:  1. Moderate episode of recurrent major depressive disorder ([facility] [address]    2.  MAXIMO (generalized anxiety disorder)          Plan:  Pt interventions:  Supportive techniques, Reviewed Sleep Hygiene tips including: stimulus reduction and Collaboratively set goals with pt re: walking to and from work 3x/week

## 2022-03-17 DIAGNOSIS — E34.9 ELEVATED PARATHYROID HORMONE: ICD-10-CM

## 2022-03-17 DIAGNOSIS — E83.52 HYPERCALCEMIA: ICD-10-CM

## 2022-03-17 DIAGNOSIS — E05.10 TOXIC THYROID NODULE: ICD-10-CM

## 2022-03-17 DIAGNOSIS — E03.9 ACQUIRED HYPOTHYROIDISM: ICD-10-CM

## 2022-03-17 DIAGNOSIS — E05.90 HYPERTHYROIDISM: Primary | ICD-10-CM

## 2022-03-17 NOTE — TELEPHONE ENCOUNTER
From: Rojelio Walter  To: Shahriar Anna  Sent: 3/16/2022 6:21 PM EDT  Subject: MRI    Gisele Gaines i would still like to get an MRI of my neck and lower back/right hip. im still in alot of pain ! also can you find in my medical records where dr [place] Tran diagnosed me with fibromialgia, if not can you send me something on that to give to my employer.  thank you jordan walden

## 2022-03-18 LAB — ADRENOCORTICOTROPIC HORMONE: 8 PG/ML (ref 6–58)

## 2022-04-10 DIAGNOSIS — I10 ESSENTIAL HYPERTENSION, BENIGN: ICD-10-CM

## 2022-04-10 DIAGNOSIS — E03.9 ACQUIRED HYPOTHYROIDISM: ICD-10-CM

## 2022-04-10 DIAGNOSIS — F41.9 ANXIETY: ICD-10-CM

## 2022-04-10 DIAGNOSIS — F32.A DEPRESSION, UNSPECIFIED DEPRESSION TYPE: ICD-10-CM

## 2022-04-11 RX ORDER — LEVOTHYROXINE SODIUM 0.05 MG/1
TABLET ORAL
Qty: 90 TABLET | Refills: 1 | Status: SHIPPED | OUTPATIENT
Start: 2022-04-11 | End: 2022-10-06

## 2022-04-11 RX ORDER — LISINOPRIL 20 MG/1
TABLET ORAL
Qty: 90 TABLET | Refills: 1 | OUTPATIENT
Start: 2022-04-11

## 2022-04-11 RX ORDER — HYDROCHLOROTHIAZIDE 25 MG/1
TABLET ORAL
Qty: 90 TABLET | Refills: 1 | Status: SHIPPED | OUTPATIENT
Start: 2022-04-11 | End: 2022-10-06

## 2022-04-11 RX ORDER — FLUOXETINE 20 MG/1
20 TABLET, FILM COATED ORAL DAILY
Qty: 90 TABLET | Refills: 3 | OUTPATIENT
Start: 2022-04-11

## 2022-05-03 PROBLEM — M62.89 MUSCLE FATIGUE: Status: ACTIVE | Noted: 2020-03-01

## 2022-05-03 PROBLEM — M25.559 HIP JOINT PAIN: Status: ACTIVE | Noted: 2020-03-01

## 2022-05-10 ENCOUNTER — OFFICE VISIT (OUTPATIENT)
Dept: ENDOCRINOLOGY | Age: 57
End: 2022-05-10
Payer: COMMERCIAL

## 2022-05-10 VITALS
BODY MASS INDEX: 34.27 KG/M2 | WEIGHT: 193.4 LBS | OXYGEN SATURATION: 96 % | HEIGHT: 63 IN | SYSTOLIC BLOOD PRESSURE: 128 MMHG | HEART RATE: 78 BPM | DIASTOLIC BLOOD PRESSURE: 88 MMHG

## 2022-05-10 DIAGNOSIS — E06.4 RADIATION THYROIDITIS: ICD-10-CM

## 2022-05-10 DIAGNOSIS — E55.9 VITAMIN D DEFICIENCY: ICD-10-CM

## 2022-05-10 DIAGNOSIS — Z98.84 H/O GASTRIC BYPASS: ICD-10-CM

## 2022-05-10 DIAGNOSIS — E21.3 HYPERPARATHYROIDISM (HCC): ICD-10-CM

## 2022-05-10 DIAGNOSIS — Z13.820 SCREENING FOR OSTEOPOROSIS: ICD-10-CM

## 2022-05-10 DIAGNOSIS — E83.52 HYPERCALCEMIA: Primary | ICD-10-CM

## 2022-05-10 PROBLEM — M81.0 POST-MENOPAUSAL OSTEOPOROSIS: Status: ACTIVE | Noted: 2022-05-10

## 2022-05-10 PROCEDURE — 99204 OFFICE O/P NEW MOD 45 MIN: CPT | Performed by: INTERNAL MEDICINE

## 2022-05-10 NOTE — PROGRESS NOTES
Patient ID: Brent Ayala is a 61 y.o. female    Chief Complaint: hypercalcemia/hyperparathyroidism. Referred by Dr. Chestine Apley, MD     HPI:   Brent Ayala is here for initial evaluation of hypercalcemia/hyperparathyroidism. She had gastric bypass surgery for weight loss in Sep 2002   Received LOPEZ for hyperthyroidism around 2010   She is taking HCTZ 25 mg daily for a long time     Calcium was 11, high for the first moses ein Jan 2022  Ca 11.1, .8, Vit D 40 - March 2022     She thinks she has memory issues and fogginess. She has muscle pains and joint pains, fibromyalgia. She has low energy levels. Patient is asymptomatic of polyuria, polydipsia, declining higher mental functions, memory cognition,     Never had kidney stones or fractures,   No family history of fracture   She had partial hysterectomy in 40's. Hot flashes since 51's      All of her sisters have or had thyroid issues. No family history of parathyroid, pituitary or adrenal tumors. No known family history of hypercalcemia or kidney stones.      Denies use of tums, loop diuretics, HCTZ, lithium    Prediabetes and hypothyroidism as per pcp     The following portions of the patient's history were reviewed and updated as appropriate:     Family History   Problem Relation Age of Onset    Cancer Mother         cervical    Cancer Father         lung    Heart Failure Father         passed from heart attack    Heart Attack Father     Diabetes Sister     Drug Abuse Sister     Other Sister         drug overdose    Diabetes Paternal Uncle     Alzheimer's Disease Maternal Grandmother     Stroke Sister     Seizures Neg Hx     Thyroid Disease Neg Hx     High Cholesterol Neg Hx     Hypertension Neg Hx     High Blood Pressure Neg Hx          Social History     Socioeconomic History    Marital status:      Spouse name: Not on file    Number of children: Not on file    Years of education: Not on file    Highest education level: Not on file   Occupational History    Not on file   Tobacco Use    Smoking status: Former Smoker     Packs/day: 1.00     Years: 7.00     Pack years: 7.00     Types: Cigarettes     Quit date: 1985     Years since quittin.3    Smokeless tobacco: Never Used   Vaping Use    Vaping Use: Never used   Substance and Sexual Activity    Alcohol use: Yes     Comment: socially on occassion    Drug use: Not on file    Sexual activity: Yes     Partners: Male     Comment:    Other Topics Concern    Not on file   Social History Narrative    Not on file     Social Determinants of Health     Financial Resource Strain: Low Risk     Difficulty of Paying Living Expenses: Not hard at all   Food Insecurity: No Food Insecurity    Worried About Running Out of Food in the Last Year: Never true    [place] of Food in the Last Year: Never true   Transportation Needs:     Lack of Transportation (Medical): Not on file    Lack of Transportation (Non-Medical):  Not on file   Physical Activity:     Days of Exercise per Week: Not on file    Minutes of Exercise per Session: Not on file   Stress:     Feeling of Stress : Not on file   Social Connections:     Frequency of Communication with Friends and Family: Not on file    Frequency of Social Gatherings with Friends and Family: Not on file    Attends [employer] Services: Not on file    Active Member of [address] [place] [address] or Organizations: Not on file    Attends Club or Organization Meetings: Not on file    Marital Status: Not on file   Intimate Partner Violence:     Fear of Current or Ex-Partner: Not on file    Emotionally Abused: Not on file    Physically Abused: Not on file    Sexually Abused: Not on file   Housing Stability:     Unable to Pay for Housing in the Last Year: Not on file    Number of [place] in the Last Year: Not on file    Unstable Housing in the Last Year: Not on file         Past Medical History:   Diagnosis Date    High calcium levels     Hypertension     resolved after gastric bypass    Radiation thyroiditis     Thyroid disease          Past Surgical History:   Procedure Laterality Date    CHOLECYSTECTOMY  1987    GASTRIC BYPASS SURGERY      5 yrs ago    HYSTERECTOMY      2009 for heavy bleeding         Allergies   Allergen Reactions    Morphine Itching         Current Outpatient Medications:     hydroCHLOROthiazide (HYDRODIURIL) 25 MG tablet, TAKE ONE TABLET BY MOUTH DAILY, Disp: 90 tablet, Rfl: 1    levothyroxine (SYNTHROID) 50 MCG tablet, TAKE ONE TABLET BY MOUTH DAILY, Disp: 90 tablet, Rfl: 1    lisinopril (PRINIVIL;ZESTRIL) 20 MG tablet, TAKE ONE TABLET BY MOUTH DAILY, Disp: 90 tablet, Rfl: 1    triamcinolone (KENALOG) 0.1 % cream, Apply topically 2 times daily. , Disp: 80 g, Rfl: 3    FLUoxetine (PROZAC) 20 MG tablet, Take 1 tablet by mouth daily, Disp: 90 tablet, Rfl: 3    azelastine (ASTELIN) 0.1 % nasal spray, 1 spray by Nasal route 2 times daily , Disp: , Rfl:     fluticasone (FLONASE) 50 MCG/ACT nasal spray, 1 spray by Nasal route daily , Disp: , Rfl:     fluocinonide (LIDEX) 0.05 % cream, Apply topically as needed , Disp: , Rfl:     omeprazole (PRILOSEC) 20 MG delayed release capsule, Take 20 mg by mouth as needed , Disp: , Rfl:     Review of Systems:  Constitutional: Negative for fever, chills, and unexpected weight change. HENT: Negative for congestion, ear pain, rhinorrhea,  sore throat and trouble swallowing. Eyes: Negative for photophobia, redness, itching. Respiratory: Negative for cough, shortness of breath and sputum. Cardiovascular: Negative for chest pain, palpitations and leg swelling. Gastrointestinal: Negative for nausea, vomiting, abdominal pain, diarrhea, constipation. Endocrine: Negative for cold intolerance, heat intolerance, polydipsia, polyphagia and polyuria. Genitourinary: Negative for dysuria, urgency, frequency, hematuria and flank pain.    Musculoskeletal: she has pain all over including back pain and neck pain. Skin/Nail: Negative for rash, itching. Normal nails. Neurological: Negative for seizures, weakness, light-headedness, numbness. Headaches present. Hematological/ Lymph nodes: Negative for adenopathy. Does not bruise/bleed easily. Psychiatric/Behavioral: Negative for suicidal ideas, depression, anxiety, sleep disturbance and decreased concentration. Physical Exam:  /88 (Site: Left Upper Arm, Position: Sitting, Cuff Size: Large Adult)   Pulse 78   Ht 5' 3\" (1.6 m)   Wt 193 lb 6.4 oz (87.7 kg)   SpO2 96%   BMI 34.26 kg/m²      Constitutional: Patient is oriented to person, place, and time. Patient appears well-developed and well-nourished. HENT:    Head: Normocephalic and atraumatic. Eyes: Conjunctivae and EOM are normal.     Neck: Normal range of motion. Thyroid small. Cardiovascular: Normal rate, regular rhythm and normal heart sounds  Pulmonary/Chest: Effort normal and breath sounds normal.  Musculoskeletal: Normal range of motion. Neurological: Patient is alert and oriented to person, place, and time. Patient has normal reflexes. No hand tremors. Skin: Skin is warm and dry. Psychiatric: Patient has a normal mood and affect.  Patient behavior is normal.    Lab Review:    Orders Only on 03/16/2022   Component Date Value Ref Range Status    Calcium 03/16/2022 11.1* 8.3 - 10.6 mg/dL Final    Vit D, 25-Hydroxy 03/16/2022 40.5  >=30 ng/mL Final    PTH 03/16/2022 103.8* 14.0 - 72.0 pg/mL Final    ACTH 03/16/2022 8  6 - 58 pg/mL Final   Orders Only on 01/24/2022   Component Date Value Ref Range Status    Sodium 01/24/2022 141  136 - 145 mmol/L Final    Potassium 01/24/2022 4.3  3.5 - 5.1 mmol/L Final    Chloride 01/24/2022 100  99 - 110 mmol/L Final    CO2 01/24/2022 29  21 - 32 mmol/L Final    Anion Gap 01/24/2022 12  3 - 16 Final    Glucose 01/24/2022 95  70 - 99 mg/dL Final    BUN 01/24/2022 17  7 - 20 mg/dL Final    CREATININE 01/24/2022 0.8  0.6 - 1.1 mg/dL Final    GFR Non- 01/24/2022 >60  >60 Final    GFR  01/24/2022 >60  >60 Final    Calcium 01/24/2022 11.0* 8.3 - 10.6 mg/dL Final    Total Protein 01/24/2022 7.1  6.4 - 8.2 g/dL Final    Albumin 01/24/2022 4.3  3.4 - 5.0 g/dL Final    Albumin/Globulin Ratio 01/24/2022 1.5  1.1 - 2.2 Final    Total Bilirubin 01/24/2022 0.3  0.0 - 1.0 mg/dL Final    Alkaline Phosphatase 01/24/2022 90  40 - 129 U/L Final    ALT 01/24/2022 11  10 - 40 U/L Final    AST 01/24/2022 14* 15 - 37 U/L Final    Cholesterol, Total 01/24/2022 146  0 - 199 mg/dL Final    Triglycerides 01/24/2022 161* 0 - 150 mg/dL Final    HDL 01/24/2022 54  40 - 60 mg/dL Final    LDL Calculated 01/24/2022 60  <100 mg/dL Final    VLDL Cholesterol Calculated 01/24/2022 32  Not Established mg/dL Final    WBC 01/24/2022 5.7  4.0 - 11.0 K/uL Final    RBC 01/24/2022 4.58  4.00 - 5.20 M/uL Final    Hemoglobin 01/24/2022 13.5  12.0 - 16.0 g/dL Final    Hematocrit 01/24/2022 40.4  36.0 - 48.0 % Final    MCV 01/24/2022 88.2  80.0 - 100.0 fL Final    MCH 01/24/2022 29.5  26.0 - 34.0 pg Final    MCHC 01/24/2022 33.4  31.0 - 36.0 g/dL Final    RDW 01/24/2022 13.2  12.4 - 15.4 % Final    Platelets 33/49/9478 242  135 - 450 K/uL Final    MPV 01/24/2022 8.7  5.0 - 10.5 fL Final    Hemoglobin A1C 01/24/2022 5.8  See comment % Final    eAG 01/24/2022 119.8  mg/dL Final    TSH Reflex FT4 01/24/2022 1.13  0.27 - 4.20 uIU/mL Final   Orders Only on 05/24/2021   Component Date Value Ref Range Status    OCCULT BLOOD FECAL 05/24/2021 negative   Final          Assessment and plan:     [facility] was seen today for new patient and other. Diagnoses and all orders for this visit:    Hypercalcemia  -     Comprehensive Metabolic Panel; Future  -     Vitamin D 25 Hydroxy; Future  -     PTH, Intact; Future  -     Phosphorus; Future  -     Magnesium; Future  -     Calcium, 24 HR Urine;  Future  -     DEXA bone density peripheral; Future  -     DEXA BONE DENSITY AXIAL SKELETON; Future    Hyperparathyroidism ([address]  -     Comprehensive Metabolic Panel; Future  -     Vitamin D 25 Hydroxy; Future  -     PTH, Intact; Future  -     Phosphorus; Future  -     Magnesium; Future  -     Calcium, 24 HR Urine; Future  -     DEXA bone density peripheral; Future  -     DEXA BONE DENSITY AXIAL SKELETON; Future    Radiation thyroiditis  -     Comprehensive Metabolic Panel; Future  -     Vitamin D 25 Hydroxy; Future  -     PTH, Intact; Future  -     Phosphorus; Future  -     Magnesium; Future  -     Calcium, 24 HR Urine; Future    H/O gastric bypass  -     Comprehensive Metabolic Panel; Future  -     Vitamin D 25 Hydroxy; Future  -     PTH, Intact; Future  -     Phosphorus; Future  -     Magnesium; Future  -     Calcium, 24 HR Urine; Future  -     DEXA bone density peripheral; Future  -     DEXA BONE DENSITY AXIAL SKELETON; Future    Vitamin D deficiency  -     Comprehensive Metabolic Panel; Future  -     Vitamin D 25 Hydroxy; Future  -     PTH, Intact; Future  -     Phosphorus; Future  -     Magnesium; Future  -     Calcium, 24 HR Urine; Future    Screening for osteoporosis  -     Comprehensive Metabolic Panel; Future  -     Vitamin D 25 Hydroxy; Future  -     PTH, Intact; Future  -     Phosphorus; Future  -     Magnesium; Future  -     Calcium, 24 HR Urine;  Future  -     DEXA bone density peripheral; Future        1: Hypercalcemia   Hyperparathyroidism   H/o gastric bypass     Stop HCTZ for 2 weeks and then do blood work and 24 hour urine calcium/cr     At this time she does not meet surgical criteria at this time     If 24 hour urine calcium is high or she has osteoporosis then get NM parathyroid scan and refer to ENT     2: Postmenopausal   Will do DXA       2: Vit D   40 - March 2022   She stopped gummies   I asked her to restart gummies now       RTC in 3 months        Electronically signed by Gabbi Hfufman MD on 5/10/2022 at 2:39 PM

## 2022-05-18 ENCOUNTER — HOSPITAL ENCOUNTER (OUTPATIENT)
Dept: GENERAL RADIOLOGY | Age: 57
Discharge: HOME | End: 2022-05-18
Payer: COMMERCIAL

## 2022-05-18 DIAGNOSIS — Z13.820 SCREENING FOR OSTEOPOROSIS: ICD-10-CM

## 2022-05-18 DIAGNOSIS — E83.52 HYPERCALCEMIA: ICD-10-CM

## 2022-05-18 DIAGNOSIS — E21.3 HYPERPARATHYROIDISM (HCC): ICD-10-CM

## 2022-05-18 DIAGNOSIS — Z98.84 H/O GASTRIC BYPASS: ICD-10-CM

## 2022-05-18 PROCEDURE — 77080 DXA BONE DENSITY AXIAL: CPT

## 2022-05-24 DIAGNOSIS — E21.3 HYPERPARATHYROIDISM (HCC): ICD-10-CM

## 2022-05-24 DIAGNOSIS — E06.4 RADIATION THYROIDITIS: ICD-10-CM

## 2022-05-24 DIAGNOSIS — E83.52 HYPERCALCEMIA: ICD-10-CM

## 2022-05-24 DIAGNOSIS — Z13.820 SCREENING FOR OSTEOPOROSIS: ICD-10-CM

## 2022-05-24 DIAGNOSIS — E55.9 VITAMIN D DEFICIENCY: ICD-10-CM

## 2022-05-24 DIAGNOSIS — Z98.84 H/O GASTRIC BYPASS: ICD-10-CM

## 2022-05-24 LAB
24HR URINE VOLUME (ML): 2000 ML
A/G RATIO: 1.6 (ref 1.1–2.2)
ALBUMIN SERPL-MCNC: 3.6 G/DL (ref 3.4–5)
ALP BLD-CCNC: 78 U/L (ref 40–129)
ALT SERPL-CCNC: 10 U/L (ref 10–40)
ANION GAP SERPL CALCULATED.3IONS-SCNC: 11 MMOL/L (ref 3–16)
AST SERPL-CCNC: 11 U/L (ref 15–37)
BILIRUB SERPL-MCNC: 0.3 MG/DL (ref 0–1)
BUN BLDV-MCNC: 14 MG/DL (ref 7–20)
CALCIUM 24 HOUR URINE: 54 MG/24 HR (ref 42–353)
CALCIUM SERPL-MCNC: 9.3 MG/DL (ref 8.3–10.6)
CHLORIDE BLD-SCNC: 110 MMOL/L (ref 99–110)
CO2: 24 MMOL/L (ref 21–32)
CREAT SERPL-MCNC: 0.7 MG/DL (ref 0.6–1.1)
CREATININE 24 HOUR URINE: 1.1 G/24HR (ref 0.6–1.5)
GFR AFRICAN AMERICAN: >60
GFR NON-AFRICAN AMERICAN: >60
GLUCOSE BLD-MCNC: 80 MG/DL (ref 70–99)
MAGNESIUM: 2.3 MG/DL (ref 1.8–2.4)
PARATHYROID HORMONE INTACT: 109.2 PG/ML (ref 14–72)
PHOSPHORUS: 3.3 MG/DL (ref 2.5–4.9)
POTASSIUM SERPL-SCNC: 4.3 MMOL/L (ref 3.5–5.1)
SODIUM BLD-SCNC: 145 MMOL/L (ref 136–145)
TOTAL PROTEIN: 5.8 G/DL (ref 6.4–8.2)
VITAMIN D 25-HYDROXY: 41.3 NG/ML

## 2022-06-03 ENCOUNTER — OFFICE VISIT (OUTPATIENT)
Dept: INTERNAL MEDICINE CLINIC | Age: 57
End: 2022-06-03
Payer: COMMERCIAL

## 2022-06-03 VITALS
WEIGHT: 193 LBS | BODY MASS INDEX: 34.19 KG/M2 | HEART RATE: 70 BPM | SYSTOLIC BLOOD PRESSURE: 128 MMHG | DIASTOLIC BLOOD PRESSURE: 82 MMHG | OXYGEN SATURATION: 98 %

## 2022-06-03 DIAGNOSIS — R20.0 NUMBNESS AND TINGLING OF BOTH LEGS: ICD-10-CM

## 2022-06-03 DIAGNOSIS — M54.16 LUMBAR RADICULOPATHY: Primary | ICD-10-CM

## 2022-06-03 DIAGNOSIS — M25.50 MULTIPLE JOINT PAIN: ICD-10-CM

## 2022-06-03 DIAGNOSIS — M25.551 RIGHT HIP PAIN: ICD-10-CM

## 2022-06-03 DIAGNOSIS — R20.2 NUMBNESS AND TINGLING OF BOTH LEGS: ICD-10-CM

## 2022-06-03 DIAGNOSIS — M48.061 SPINAL STENOSIS OF LUMBAR REGION WITHOUT NEUROGENIC CLAUDICATION: ICD-10-CM

## 2022-06-03 PROCEDURE — 99214 OFFICE O/P EST MOD 30 MIN: CPT | Performed by: NURSE PRACTITIONER

## 2022-06-03 ASSESSMENT — PATIENT HEALTH QUESTIONNAIRE - PHQ9
SUM OF ALL RESPONSES TO PHQ QUESTIONS 1-9: 1
9. THOUGHTS THAT YOU WOULD BE BETTER OFF DEAD, OR OF HURTING YOURSELF: 0
1. LITTLE INTEREST OR PLEASURE IN DOING THINGS: 1
3. TROUBLE FALLING OR STAYING ASLEEP: 0
SUM OF ALL RESPONSES TO PHQ QUESTIONS 1-9: 1
2. FEELING DOWN, DEPRESSED OR HOPELESS: 0
5. POOR APPETITE OR OVEREATING: 0
SUM OF ALL RESPONSES TO PHQ QUESTIONS 1-9: 1
8. MOVING OR SPEAKING SO SLOWLY THAT OTHER PEOPLE COULD HAVE NOTICED. OR THE OPPOSITE, BEING SO FIGETY OR RESTLESS THAT YOU HAVE BEEN MOVING AROUND A LOT MORE THAN USUAL: 0
4. FEELING TIRED OR HAVING LITTLE ENERGY: 0
6. FEELING BAD ABOUT YOURSELF - OR THAT YOU ARE A FAILURE OR HAVE LET YOURSELF OR YOUR FAMILY DOWN: 0
7. TROUBLE CONCENTRATING ON THINGS, SUCH AS READING THE NEWSPAPER OR WATCHING TELEVISION: 0
SUM OF ALL RESPONSES TO PHQ QUESTIONS 1-9: 1
SUM OF ALL RESPONSES TO PHQ9 QUESTIONS 1 & 2: 1

## 2022-06-03 NOTE — PROGRESS NOTES
6/3/22     Chief Complaint   Patient presents with    Hip Pain     Right hip and back pain x2 years. Ongoing issue, we have done xrays before but pt. is asking about an MRI     HPI    Patient is here for follow up of ongoing lower back pain radiating into right hip/ knee. Reports has been ongoing for the past few years. Her last appt for this pain was in Jan - it has been recommended that she start PT multiple times, she has not completed PT. Continues to report that she has trouble sitting and laying on her right side due to the discomfort - reports the pain has increased in intensity since last visit. Is having numbness and tingling in her bilateral lower ext when standing or walking for long periods of time. She had an x-ray of her lumbar spine and hip complete on 2/2/2022 which noted degenerative/arthritic changes. MRI lumbar 12/16/2020 noted moderate stenosis L2-3, L3-4, L4-5    She does not tolerate nsaids or tylenol after gastric surgery. If needed will take with PPI to help with GI symptoms. Allergies   Allergen Reactions    Morphine Itching     Current Outpatient Medications   Medication Sig Dispense Refill    diclofenac sodium (VOLTAREN) 1 % GEL Apply 4 g topically 4 times daily as needed for Pain 100 g 0    hydroCHLOROthiazide (HYDRODIURIL) 25 MG tablet TAKE ONE TABLET BY MOUTH DAILY 90 tablet 1    levothyroxine (SYNTHROID) 50 MCG tablet TAKE ONE TABLET BY MOUTH DAILY 90 tablet 1    lisinopril (PRINIVIL;ZESTRIL) 20 MG tablet TAKE ONE TABLET BY MOUTH DAILY 90 tablet 1    triamcinolone (KENALOG) 0.1 % cream Apply topically 2 times daily.  80 g 3    FLUoxetine (PROZAC) 20 MG tablet Take 1 tablet by mouth daily 90 tablet 3    azelastine (ASTELIN) 0.1 % nasal spray 1 spray by Nasal route 2 times daily       fluticasone (FLONASE) 50 MCG/ACT nasal spray 1 spray by Nasal route daily       fluocinonide (LIDEX) 0.05 % cream Apply topically as needed       omeprazole (PRILOSEC) 20 MG delayed release capsule Take 20 mg by mouth as needed        No current facility-administered medications for this visit. Review of Systems  Negative other than HPI    Vitals:    06/03/22 1036   BP: 128/82   Pulse: 70   SpO2: 98%   Weight: 193 lb (87.5 kg)      Physical Exam  Constitutional:       General: She is not in acute distress. Appearance: Normal appearance. She is not ill-appearing. HENT:      Head: Normocephalic and atraumatic. Pulmonary:      Effort: Pulmonary effort is normal. No respiratory distress. Musculoskeletal:      Lumbar back: Spasms and tenderness present. No swelling or deformity. Decreased range of motion. Right lower leg: No edema. Left lower leg: No edema. Neurological:      General: No focal deficit present. Mental Status: She is alert and oriented to person, place, and time. Mental status is at baseline. Psychiatric:         Mood and Affect: Mood normal.         Behavior: Behavior normal.       Assessment/Plan:  Lumbar radiculopathy/ Spinal stenosis of lumbar region without neurogenic claudication/ Numbness and tingling of both legs/ Right hip pain/ Multiple joint pain  Chronic, uncontrolled and worsening   Reviewed supportive care with stretches   Trial of topical nsaid, lidocaine patches  Order for MRI given worsening symptoms including numbness and tingling   Referral for back / spine center   - [address] [place] [address]; Future  - diclofenac sodium (VOLTAREN) 1 % GEL; Apply 4 g topically 4 times daily as needed for Pain  Dispense: 100 g; Refill: 0    Discussed medications with patient, who voiced understanding of their use and indications. All questions answered.     FU with pcp when due for CC     Electronically signed by DEEPAK Obregon CNP on 6/3/2022 at 10:58 AM

## 2022-06-09 DIAGNOSIS — M25.551 RIGHT HIP PAIN: ICD-10-CM

## 2022-06-09 DIAGNOSIS — R20.0 NUMBNESS AND TINGLING OF BOTH LEGS: ICD-10-CM

## 2022-06-09 DIAGNOSIS — R20.2 NUMBNESS AND TINGLING OF BOTH LEGS: ICD-10-CM

## 2022-06-09 DIAGNOSIS — M54.16 LUMBAR RADICULOPATHY: ICD-10-CM

## 2022-06-09 DIAGNOSIS — M25.50 MULTIPLE JOINT PAIN: ICD-10-CM

## 2022-06-09 DIAGNOSIS — M48.061 SPINAL STENOSIS OF LUMBAR REGION WITHOUT NEUROGENIC CLAUDICATION: ICD-10-CM

## 2022-06-09 PROBLEM — Z13.820 SCREENING FOR OSTEOPOROSIS: Status: RESOLVED | Noted: 2022-05-10 | Resolved: 2022-06-09

## 2022-06-10 ENCOUNTER — TELEPHONE (OUTPATIENT)
Dept: INTERNAL MEDICINE CLINIC | Age: 57
End: 2022-06-10

## 2022-06-10 NOTE — TELEPHONE ENCOUNTER
[facility] Dept calling---MRI Lumbar Spine ordered was denied by insurance---she was faxing over all the details over to you today---thanks.

## 2022-06-16 ENCOUNTER — PATIENT MESSAGE (OUTPATIENT)
Dept: INTERNAL MEDICINE CLINIC | Age: 57
End: 2022-06-16

## 2022-06-16 NOTE — TELEPHONE ENCOUNTER
From: Vega Smith  To: Syed Hussein  Sent: 6/16/2022 9:57 AM EDT  Subject: MRI    Insurance denied the MRI! I think they need to know Pa Martini been in this pain for 2 years, the letter I received said I was having problems for 6 weeks!

## 2022-06-17 SDOH — HEALTH STABILITY: PHYSICAL HEALTH: ON AVERAGE, HOW MANY DAYS PER WEEK DO YOU ENGAGE IN MODERATE TO STRENUOUS EXERCISE (LIKE A BRISK WALK)?: 0 DAYS

## 2022-06-20 ENCOUNTER — OFFICE VISIT (OUTPATIENT)
Dept: ORTHOPEDIC SURGERY | Age: 57
End: 2022-06-20
Payer: COMMERCIAL

## 2022-06-20 VITALS — WEIGHT: 192.9 LBS | BODY MASS INDEX: 34.18 KG/M2 | HEIGHT: 63 IN

## 2022-06-20 DIAGNOSIS — M94.251 CHONDROMALACIA OF HIP, RIGHT: ICD-10-CM

## 2022-06-20 DIAGNOSIS — M79.2 NEUROGENIC PAIN OF LOWER EXTREMITY, LEFT: ICD-10-CM

## 2022-06-20 DIAGNOSIS — M48.061 DEGENERATIVE LUMBAR SPINAL STENOSIS: Primary | ICD-10-CM

## 2022-06-20 DIAGNOSIS — M48.00 MULTILEVEL SPINAL STENOSIS: ICD-10-CM

## 2022-06-20 DIAGNOSIS — R20.2 PARESTHESIA OF RIGHT LOWER EXTREMITY: ICD-10-CM

## 2022-06-20 DIAGNOSIS — M79.2 NEUROGENIC PAIN OF RIGHT LOWER EXTREMITY: ICD-10-CM

## 2022-06-20 DIAGNOSIS — Z98.84 HISTORY OF GASTRIC BYPASS: ICD-10-CM

## 2022-06-20 PROCEDURE — 99204 OFFICE O/P NEW MOD 45 MIN: CPT | Performed by: INTERNAL MEDICINE

## 2022-06-20 RX ORDER — CELECOXIB 200 MG/1
200 CAPSULE ORAL DAILY
Qty: 30 CAPSULE | Refills: 1 | Status: SHIPPED | OUTPATIENT
Start: 2022-06-20 | End: 2022-08-22

## 2022-06-20 NOTE — PROGRESS NOTES
Chief Complaint:   Chief Complaint   Patient presents with    Lower Back Pain     feeling some back pain, has had pain for 2 yrs, not getting any better    Hip Pain     right, hip down into ant R thigh to R knee, pain is constant, sometimes tingling          History of Present Illness:       Patient is a 61 y.o. female presents with the above complaint. The symptoms began 2  yearsago and started without an injury. The patient describes a burning, numbness pain that does radiate. The symptoms are intermittent  and are are worsening since the onset and typically occurs as a nocturnal event more notable sleeping on her sides    This pain l localizes to the lumbosacral region right with radiation about the lateral hip into the anterior thigh and knee without peripheralization. The other component of pain is described as subjective aching numbness and heaviness of the lower extremities diffusely that shows a neurogenic claudication pattern and is worsened by standing and walking and improved with sitting. There is not new onset weakness or progressive weakness of the lower extremities that has developed. The patient denies new onset bowel or bladder dysfunction. There is no history of previous spinal trauma. The patient does not have history or orthopaedic lumbar spine surgery. Pain levels: 9    Back pain to leg pain ratio 30:70    Work-up to date has included:X-ray and MRI-lumbar spine and x-ray of the right hip  Prior treatment has included extensive chiropractic. This patient reports no improvement with this treatment. There is no prior history of autoimmune disease, autoimmune disease, or crystal arthropathy. The patient has no history or autoimmune disease, inflammatory arthropathy or crystal arthropathy.       Past Medical History:        Past Medical History:   Diagnosis Date    High calcium levels     Hypertension     resolved after gastric bypass    Radiation thyroiditis     Thyroid disease          Past Surgical History:   Procedure Laterality Date    CHOLECYSTECTOMY      GASTRIC BYPASS SURGERY      5 yrs ago    HYSTERECTOMY (CERVIX STATUS UNKNOWN)       for heavy bleeding         Present Medications:         Current Outpatient Medications   Medication Sig Dispense Refill    celecoxib (CELEBREX) 200 MG capsule Take 1 capsule by mouth daily 30 capsule 1    diclofenac sodium (VOLTAREN) 1 % GEL APPLY FOUR GRAM TOPICALLY  FOUR TIMES A DAY AS NEEDED FOR PAIN 100 g 0    hydroCHLOROthiazide (HYDRODIURIL) 25 MG tablet TAKE ONE TABLET BY MOUTH DAILY 90 tablet 1    levothyroxine (SYNTHROID) 50 MCG tablet TAKE ONE TABLET BY MOUTH DAILY 90 tablet 1    lisinopril (PRINIVIL;ZESTRIL) 20 MG tablet TAKE ONE TABLET BY MOUTH DAILY 90 tablet 1    triamcinolone (KENALOG) 0.1 % cream Apply topically 2 times daily. 80 g 3    FLUoxetine (PROZAC) 20 MG tablet Take 1 tablet by mouth daily 90 tablet 3    azelastine (ASTELIN) 0.1 % nasal spray 1 spray by Nasal route 2 times daily       fluticasone (FLONASE) 50 MCG/ACT nasal spray 1 spray by Nasal route daily       fluocinonide (LIDEX) 0.05 % cream Apply topically as needed       omeprazole (PRILOSEC) 20 MG delayed release capsule Take 20 mg by mouth as needed        No current facility-administered medications for this visit. Allergies:         Allergies   Allergen Reactions    Morphine Itching        Social History:         Social History     Socioeconomic History    Marital status:      Spouse name: Not on file    Number of children: Not on file    Years of education: Not on file    Highest education level: Not on file   Occupational History    Not on file   Tobacco Use    Smoking status: Former Smoker     Packs/day: 1.00     Years: 7.00     Pack years: 7.00     Types: Cigarettes     Quit date: 1985     Years since quittin.4    Smokeless tobacco: Never Used   Vaping Use    Vaping Use: Never used   Substance and Sexual Activity    Alcohol use: Yes     Comment: socially on occassion    Drug use: Not on file    Sexual activity: Yes     Partners: Male     Comment:    Other Topics Concern    Not on file   Social History Narrative    Not on file     Social Determinants of Health     Financial Resource Strain: Low Risk     Difficulty of Paying Living Expenses: Not hard at all   Food Insecurity: No Food Insecurity    Worried About Running Out of Food in the Last Year: Never true    [address] in the Last Year: Never true   Transportation Needs:     Lack of Transportation (Medical): Not on file    Lack of Transportation (Non-Medical): Not on file   Physical Activity: Unknown    Days of Exercise per Week: 0 days    Minutes of Exercise per Session: Not on file   Stress:     Feeling of Stress : Not on file   Social Connections:     Frequency of Communication with Friends and Family: Not on file    Frequency of Social Gatherings with Friends and Family: Not on file    Attends Confucianism Services: Not on file    Active Member of [address] [place] [address] or Organizations: Not on file    Attends Club or Organization Meetings: Not on file    Marital Status: Not on file   Intimate Partner Violence: Not At Risk    Fear of Current or Ex-Partner: No    Emotionally Abused: No    Physically Abused: No    Sexually Abused: No   Housing Stability:     Unable to Pay for Housing in the Last Year: Not on file    Number of [place] in the Last Year: Not on file    Unstable Housing in the Last Year: Not on file        Review of Symptoms:    Pertinent items are noted in HPI    Review of systems reviewed from Patient History Form dated on today's date and   available in the patient's chart under the Media tab. Vital Signs: There were no vitals filed for this visit. General Exam:     Constitutional: Patient is adequately groomed with no evidence of malnutrition  Mental Status:  The patient is oriented to time, place and person. The patient's mood and affect are appropriate. Vascular: Examination reveals no swelling or calf tenderness. Peripheral pulses are palpable and 2+. Lymphatics: no lymphadenopathy of the inguinal region or lower extremity      Physical Exam: lower back   General: Obese body habitus   Primary Exam:    Inspection: No deformity atrophy appreciable curvature      Palpation: No focal trigger point tenderness      Range of Motion: 60/15 pain with extension greater than flexion      Strength: Normal lower extremity      Special Tests: Negative SLR      Skin: There are no rashes, ulcerations or lesions. Gait: Nonantalgic      Reflex intact lower     Additional Comments:        Additional Examinations:           Right Lower Extremity: Examination of the right hip does not show any tenderness, deformity or injury. Range of motion is symmetric with mild pain internal rotation. There is no gross instability. There are no rashes, ulcerations or lesions. Strength and tone are normal.  Left Lower Extremity: Examination of the left hip does not show any tenderness, deformity or injury. Range of motion is unremarkable. There is no gross instability. There are no rashes, ulcerations or lesions. Strength and tone are normal.  Neurolgic -Light touch sensation and manual muscle testing normal L2-S1. No fasiculations. Pattella tendon and Achilles tendon reflexes +2 bilaterally. Seated SLR negative         Office Imaging Results/Procedures PerformedToday:           Office Procedures:     Orders Placed This Encounter   Procedures    EMG     [facility]- Kerry Patel MD  [address] [place] [address]  [phone]    Please call Dr. Alize Lugo office to schedule your appt.   This is your responsibility to schedule, since it is a test order and not a referral.  Results will be sent to Dr. Leighton Tracy within 24 hours, so you may schedule your follow up appt 1-2 days after your EMG to go over your results in the clinic. Please call us to schedule your follow up at [phone]. Standing Status:   Future     Standing Expiration Date:   6/20/2023     Order Specific Question:   Which body part? Answer:   RLE           Other Outside Imaging and Testing Personally Reviewed:      LUMBAR SPINE 3 views        INDICATIONS: Pain       COMPARISON: None       5 lumbar vertebral bodies       Lower lumbar facet arthropathy L5-S1        Mild reversal of curvature at L1-L2       L1-L2 and L2-L3 and L3-L4 degenerative disc disease       Mild degenerative retrolisthesis of L4 on L5           Soft tissues: Normal   Surgical clips right upper quadrant, cholecystectomy       Facet joints: Unremarkable       [IMPRESSION]   1. Spondylosis with degenerative retrolisthesis of L3 on L4 or   2. Mild disc space narrowing as described.    3. Lower lumbar facet arthropathy       Electronically Signed by: Benji Barriga MD, 2/2/2022 3:50 PM         EXAMINATION:   MRI OF THE LUMBAR SPINE WITHOUT CONTRAST, 12/16/2020 10:11 am       TECHNIQUE:   Multiplanar multisequence MRI of the lumbar spine was performed without the   administration of intravenous contrast.       COMPARISON:   None.       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  No evidence   of spondylolisthesis.  Minimal loss of disc space height at L5-S1.  There is   minimal scattered Modic type degenerative endplate changes of the lumbar   spine.  Otherwise, the bone marrow signal demonstrates no acute abnormality.       SPINAL CORD: The conus terminates at a normal level.       SOFT TISSUES: No paraspinal mass identified.       L1-L2: There is no significant disc bulge, spinal canal stenosis or prior to the patient's visit along with counseling during the patient's visit with respect to treatment options inclusive of alternatives to treatment and the complications and risks related to those treatment options along with expectations of outcome related to those treatments and inclusive of time in the documentation and ordering of testing and treatment after the visit. The nature of the finding, probable diagnosis and likely treatment was thoroughly discussed with the patient. The options, risks, complications, alternative treatment as well as some of the differential diagnosis was discussed. The patient was thoroughly informed and all questions were answered. the patient indicated understanding and satisfaction with the discussion. Orders:        Orders Placed This Encounter   Procedures    EMG     [facility]- Oliva Chanel MD  [address] [place] [address]  [phone]    Please call Dr. Lisa Gomez office to schedule your appt. This is your responsibility to schedule, since it is a test order and not a referral.  Results will be sent to Dr. Benitez Prasad within 24 hours, so you may schedule your follow up appt 1-2 days after your EMG to go over your results in the clinic. Please call us to schedule your follow up at [phone]. Standing Status:   Future     Standing Expiration Date:   6/20/2023     Order Specific Question:   Which body part? Answer:   RLAMERICO           Disclaimer: \"This note was dictated with voice recognition software. Though review and correction are routine, we apologize for any errors. \"

## 2022-06-24 ENCOUNTER — PROCEDURE VISIT (OUTPATIENT)
Dept: NEUROLOGY | Age: 57
End: 2022-06-24
Payer: COMMERCIAL

## 2022-06-24 DIAGNOSIS — R20.2 PARESTHESIA OF RIGHT LOWER EXTREMITY: ICD-10-CM

## 2022-06-24 PROCEDURE — 95908 NRV CNDJ TST 3-4 STUDIES: CPT | Performed by: PSYCHIATRY & NEUROLOGY

## 2022-06-24 PROCEDURE — 95886 MUSC TEST DONE W/N TEST COMP: CPT | Performed by: PSYCHIATRY & NEUROLOGY

## 2022-06-24 NOTE — PATIENT INSTRUCTIONS
Verbal consent was obtained from patient and/or patient's advocate for in office procedure with Dr. Vandy Osgood (EMG or EEG).

## 2022-06-24 NOTE — PROGRESS NOTES
Natalie Bernheim, M.D. [facility]) [facility]  Board Certified in [address], [address] [place] [address]    EMG / NERVE CONDUCTION STUDY      PATIENT:  Catarina Dinero       DATE OF EM22     YOB: 1965       REASON FOR EMG:   Right leg pain rule out radiculopathy      REFERRING PHYSICIAN:  MD Marcelino Martinez [address]     SUMMARY:   The right peroneal motor nerve study was normal.  The right posterior tibial motor nerve study was normal.  The right sural sensory nerve study was normal.  Needle EMG of several muscles in the right lower extremity was normal.  Needle EMG of the right lumbar paraspinal muscles was normal.      CLINICAL DIAGNOSIS:  Lumbar radiculopathy        EMG RESULTS:   This is a normal EMG and nerve conduction study of the right lower extremity. There is no electrophysiological evidence for lumbar radiculopathy in this study. ---------------------------------------------  Natalie Bernheim, M.D.   Electromyographer / Neurologist

## 2022-07-06 ENCOUNTER — TELEPHONE (OUTPATIENT)
Dept: ORTHOPEDIC SURGERY | Age: 57
End: 2022-07-06

## 2022-07-06 NOTE — TELEPHONE ENCOUNTER
Other PATIENT CALLED STATES THAT THE EXERCISES ARE MAKING HER PAIN WORSE. WANTS TO KNOW THE NEXT STEPS.  PLS CALL TO ADVISE [phone]

## 2022-07-06 NOTE — TELEPHONE ENCOUNTER
[facility] advised her to discontinue the exercises and stretches. Helped schedule an appt for 07/12/2022 so we could go over plan of care since she had her EMG done.

## 2022-08-10 ENCOUNTER — OFFICE VISIT (OUTPATIENT)
Dept: ENDOCRINOLOGY | Age: 57
End: 2022-08-10
Payer: COMMERCIAL

## 2022-08-10 VITALS
HEART RATE: 70 BPM | BODY MASS INDEX: 36.8 KG/M2 | HEIGHT: 62 IN | WEIGHT: 200 LBS | SYSTOLIC BLOOD PRESSURE: 114 MMHG | OXYGEN SATURATION: 93 % | DIASTOLIC BLOOD PRESSURE: 70 MMHG

## 2022-08-10 DIAGNOSIS — E21.3 HYPERPARATHYROIDISM (HCC): ICD-10-CM

## 2022-08-10 DIAGNOSIS — E83.52 HYPERCALCEMIA: Primary | ICD-10-CM

## 2022-08-10 DIAGNOSIS — Z98.84 H/O GASTRIC BYPASS: ICD-10-CM

## 2022-08-10 DIAGNOSIS — E83.52 HYPERCALCEMIA: ICD-10-CM

## 2022-08-10 LAB
A/G RATIO: 1.6 (ref 1.1–2.2)
ALBUMIN SERPL-MCNC: 4 G/DL (ref 3.4–5)
ALP BLD-CCNC: 95 U/L (ref 40–129)
ALT SERPL-CCNC: 12 U/L (ref 10–40)
ANION GAP SERPL CALCULATED.3IONS-SCNC: 12 MMOL/L (ref 3–16)
AST SERPL-CCNC: 17 U/L (ref 15–37)
BILIRUB SERPL-MCNC: <0.2 MG/DL (ref 0–1)
BUN BLDV-MCNC: 21 MG/DL (ref 7–20)
CALCIUM SERPL-MCNC: 9.5 MG/DL (ref 8.3–10.6)
CHLORIDE BLD-SCNC: 105 MMOL/L (ref 99–110)
CO2: 28 MMOL/L (ref 21–32)
CREAT SERPL-MCNC: 1.1 MG/DL (ref 0.6–1.1)
GFR AFRICAN AMERICAN: >60
GFR NON-AFRICAN AMERICAN: 51
GLUCOSE BLD-MCNC: 66 MG/DL (ref 70–99)
PARATHYROID HORMONE INTACT: 139.2 PG/ML (ref 14–72)
POTASSIUM SERPL-SCNC: 4 MMOL/L (ref 3.5–5.1)
SODIUM BLD-SCNC: 145 MMOL/L (ref 136–145)
TOTAL PROTEIN: 6.5 G/DL (ref 6.4–8.2)
VITAMIN D 25-HYDROXY: 43.5 NG/ML

## 2022-08-10 PROCEDURE — 99214 OFFICE O/P EST MOD 30 MIN: CPT | Performed by: INTERNAL MEDICINE

## 2022-08-10 NOTE — Clinical Note
[facility]  [address] [facility]  [address] [place] [address]  Phone: [phone]  Fax: [phone]    Juan Antoine MD        August 10, 2022     Patient: Erlinda Staley   YOB: 1965   Date of Visit: 8/10/2022       To Whom It May Concern: It is my medical opinion that Benjamín Mahmood {Work release (duty restriction):92886}. If you have any questions or concerns, please don't hesitate to call.     Sincerely,        Juan Antoine MD

## 2022-08-10 NOTE — PROGRESS NOTES
Patient ID: Lonnie Maldonado is a 61 y.o. female    Chief Complaint: hypercalcemia/hyperparathyroidism. HPI:   Lonnie Maldonado is here for an evaluation of hypercalcemia/hyperparathyroidism. She had gastric bypass surgery for weight loss in Sep 2002   Received LOPEZ for hyperthyroidism around 2010   She is taking HCTZ 25 mg daily for a long time     Calcium was 11, high for the first time in Jan 2022  Ca 11.1, .8, Vit D 40 - March 2022     Interval history:   No change in symptoms   Still tired     No calcium intake   Vit D gummies daily     She is taking HCTZ 25 mg daily       FOV:     She thinks she has memory issues and fogginess. She has muscle pains and joint pains, fibromyalgia. She has low energy levels. Patient is asymptomatic of polyuria, polydipsia, declining higher mental functions, memory cognition,     Never had kidney stones or fractures,   No family history of fracture   She had partial hysterectomy in 40's. Hot flashes since 51's      All of her sisters have or had thyroid issues. No family history of parathyroid, pituitary or adrenal tumors. No known family history of hypercalcemia or kidney stones.      Denies use of tums, loop diuretics,   lithium    Prediabetes and hypothyroidism as per pcp     The following portions of the patient's history were reviewed and updated as appropriate:     Family History   Problem Relation Age of Onset    Cancer Mother         cervical    Cancer Father         lung    Heart Failure Father         passed from heart attack    Heart Attack Father     Diabetes Sister     Drug Abuse Sister     Other Sister         drug overdose    Diabetes Paternal Uncle     Alzheimer's Disease Maternal Grandmother     Stroke Sister     Seizures Neg Hx     Thyroid Disease Neg Hx     High Cholesterol Neg Hx     Hypertension Neg Hx     High Blood Pressure Neg Hx          Social History     Socioeconomic History    Marital status:      Spouse name: Not on file Number of children: Not on file    Years of education: Not on file    Highest education level: Not on file   Occupational History    Not on file   Tobacco Use    Smoking status: Former Smoker     Packs/day: 1.00     Years: 7.00     Pack years: 7.00     Types: Cigarettes     Quit date: 1985     Years since quittin.3    Smokeless tobacco: Never Used   Vaping Use    Vaping Use: Never used   Substance and Sexual Activity    Alcohol use: Yes     Comment: socially on occassion    Drug use: Not on file    Sexual activity: Yes     Partners: Male     Comment:    Other Topics Concern    Not on file   Social History Narrative    Not on file     Social Determinants of Health     Financial Resource Strain: Low Risk     Difficulty of Paying Living Expenses: Not hard at all   Food Insecurity: No Food Insecurity    Worried About [address] in the Last Year: Never true    [email or web address] in the Last Year: Never true   Transportation Needs:     Lack of Transportation (Medical): Not on file    Lack of Transportation (Non-Medical):  Not on file   Physical Activity:     Days of Exercise per Week: Not on file    Minutes of Exercise per Session: Not on file   Stress:     Feeling of Stress : Not on file   Social Connections:     Frequency of Communication with Friends and Family: Not on file    Frequency of Social Gatherings with Friends and Family: Not on file    Attends Religion Services: Not on file    Active Member of Clubs or Organizations: Not on file    Attends Club or Organization Meetings: Not on file    Marital Status: Not on file   Intimate Partner Violence:     Fear of Current or Ex-Partner: Not on file    Emotionally Abused: Not on file    Physically Abused: Not on file    Sexually Abused: Not on file   Housing Stability:     Unable to Pay for Housing in the Last Year: Not on file    Number of [place] in the Last Year: Not on file    Unstable Housing in the Last Year: Not on file         Past Medical History:   Diagnosis Date    High calcium levels     Hypertension     resolved after gastric bypass    Radiation thyroiditis     Thyroid disease          Past Surgical History:   Procedure Laterality Date    CHOLECYSTECTOMY  1987    GASTRIC BYPASS SURGERY      5 yrs ago    HYSTERECTOMY      2009 for heavy bleeding         Allergies   Allergen Reactions    Morphine Itching         Current Outpatient Medications:     hydroCHLOROthiazide (HYDRODIURIL) 25 MG tablet, TAKE ONE TABLET BY MOUTH DAILY, Disp: 90 tablet, Rfl: 1    levothyroxine (SYNTHROID) 50 MCG tablet, TAKE ONE TABLET BY MOUTH DAILY, Disp: 90 tablet, Rfl: 1    lisinopril (PRINIVIL;ZESTRIL) 20 MG tablet, TAKE ONE TABLET BY MOUTH DAILY, Disp: 90 tablet, Rfl: 1    triamcinolone (KENALOG) 0.1 % cream, Apply topically 2 times daily. , Disp: 80 g, Rfl: 3    FLUoxetine (PROZAC) 20 MG tablet, Take 1 tablet by mouth daily, Disp: 90 tablet, Rfl: 3    azelastine (ASTELIN) 0.1 % nasal spray, 1 spray by Nasal route 2 times daily , Disp: , Rfl:     fluticasone (FLONASE) 50 MCG/ACT nasal spray, 1 spray by Nasal route daily , Disp: , Rfl:     fluocinonide (LIDEX) 0.05 % cream, Apply topically as needed , Disp: , Rfl:     omeprazole (PRILOSEC) 20 MG delayed release capsule, Take 20 mg by mouth as needed , Disp: , Rfl:     Review of Systems:  Constitutional: Negative for fever, chills, and unexpected weight change. HENT: Negative for congestion, ear pain, rhinorrhea,  sore throat and trouble swallowing. Eyes: Negative for photophobia, redness, itching. Respiratory: Negative for cough, shortness of breath and sputum. Cardiovascular: Negative for chest pain, palpitations and leg swelling. Gastrointestinal: Negative for nausea, vomiting, abdominal pain, diarrhea, constipation. Endocrine: Negative for cold intolerance, heat intolerance, polydipsia, polyphagia and polyuria. Genitourinary: Negative for dysuria, urgency, frequency, hematuria and flank pain. Musculoskeletal: she has pain all over including back pain and neck pain. Skin/Nail: Negative for rash, itching. Normal nails. Neurological: Negative for seizures, weakness, light-headedness, numbness. Headaches present. Hematological/ Lymph nodes: Negative for adenopathy. Does not bruise/bleed easily. Psychiatric/Behavioral: Negative for suicidal ideas, depression, anxiety, sleep disturbance and decreased concentration. Physical Exam:  /88 (Site: Left Upper Arm, Position: Sitting, Cuff Size: Large Adult)   Pulse 78   Ht 5' 3\" (1.6 m)   Wt 193 lb 6.4 oz (87.7 kg)   SpO2 96%   BMI 34.26 kg/m²      Constitutional: Patient is oriented to person, place, and time. Patient appears well-developed and well-nourished. HENT:    Head: Normocephalic and atraumatic. Eyes: Conjunctivae and EOM are normal.     Neck: Normal range of motion. Thyroid small. Cardiovascular: Normal rate, regular rhythm and normal heart sounds  Pulmonary/Chest: Effort normal and breath sounds normal.  Musculoskeletal: Normal range of motion. Neurological: Patient is alert and oriented to person, place, and time. Patient has normal reflexes. No hand tremors. Skin: Skin is warm and dry. Psychiatric: Patient has a normal mood and affect.  Patient behavior is normal.    Lab Review:    Orders Only on 03/16/2022   Component Date Value Ref Range Status    Calcium 03/16/2022 11.1* 8.3 - 10.6 mg/dL Final    Vit D, 25-Hydroxy 03/16/2022 40.5  >=30 ng/mL Final    PTH 03/16/2022 103.8* 14.0 - 72.0 pg/mL Final    ACTH 03/16/2022 8  6 - 58 pg/mL Final   Orders Only on 01/24/2022   Component Date Value Ref Range Status    Sodium 01/24/2022 141  136 - 145 mmol/L Final    Potassium 01/24/2022 4.3  3.5 - 5.1 mmol/L Final    Chloride 01/24/2022 100  99 - 110 mmol/L Final    CO2 01/24/2022 29  21 - 32 mmol/L Final    Anion Gap 01/24/2022 12  3 - 16 Final    Glucose 01/24/2022 95  70 - 99 mg/dL Final    BUN 01/24/2022 17  7 - 20 mg/dL Final    CREATININE 01/24/2022 0.8  0.6 - 1.1 mg/dL Final    GFR Non- 01/24/2022 >60  >60 Final    GFR  01/24/2022 >60  >60 Final    Calcium 01/24/2022 11.0* 8.3 - 10.6 mg/dL Final    Total Protein 01/24/2022 7.1  6.4 - 8.2 g/dL Final    Albumin 01/24/2022 4.3  3.4 - 5.0 g/dL Final    Albumin/Globulin Ratio 01/24/2022 1.5  1.1 - 2.2 Final    Total Bilirubin 01/24/2022 0.3  0.0 - 1.0 mg/dL Final    Alkaline Phosphatase 01/24/2022 90  40 - 129 U/L Final    ALT 01/24/2022 11  10 - 40 U/L Final    AST 01/24/2022 14* 15 - 37 U/L Final    Cholesterol, Total 01/24/2022 146  0 - 199 mg/dL Final    Triglycerides 01/24/2022 161* 0 - 150 mg/dL Final    HDL 01/24/2022 54  40 - 60 mg/dL Final    LDL Calculated 01/24/2022 60  <100 mg/dL Final    VLDL Cholesterol Calculated 01/24/2022 32  Not Established mg/dL Final    WBC 01/24/2022 5.7  4.0 - 11.0 K/uL Final    RBC 01/24/2022 4.58  4.00 - 5.20 M/uL Final    Hemoglobin 01/24/2022 13.5  12.0 - 16.0 g/dL Final    Hematocrit 01/24/2022 40.4  36.0 - 48.0 % Final    MCV 01/24/2022 88.2  80.0 - 100.0 fL Final    MCH 01/24/2022 29.5  26.0 - 34.0 pg Final    MCHC 01/24/2022 33.4  31.0 - 36.0 g/dL Final    RDW 01/24/2022 13.2  12.4 - 15.4 % Final    Platelets 67/15/9873 242  135 - 450 K/uL Final    MPV 01/24/2022 8.7  5.0 - 10.5 fL Final    Hemoglobin A1C 01/24/2022 5.8  See comment % Final    eAG 01/24/2022 119.8  mg/dL Final    TSH Reflex FT4 01/24/2022 1.13  0.27 - 4.20 uIU/mL Final   Orders Only on 05/24/2021   Component Date Value Ref Range Status    OCCULT BLOOD FECAL 05/24/2021 negative   Final          Assessment and plan:     Margarito Stinson was seen today for new patient and other. Diagnoses and all orders for this visit:    Hypercalcemia  -     Comprehensive Metabolic Panel; Future  -     Vitamin D 25 Hydroxy; Future  -     PTH, Intact; Future  -     Phosphorus; Future  -     Magnesium; Future  -     Calcium, 24 HR Urine;  Future  -     DEXA bone density peripheral; Future  -     DEXA BONE DENSITY AXIAL SKELETON; Future    Hyperparathyroidism ([address]  -     Comprehensive Metabolic Panel; Future  -     Vitamin D 25 Hydroxy; Future  -     PTH, Intact; Future  -     Phosphorus; Future  -     Magnesium; Future  -     Calcium, 24 HR Urine; Future  -     DEXA bone density peripheral; Future  -     DEXA BONE DENSITY AXIAL SKELETON; Future    Radiation thyroiditis  -     Comprehensive Metabolic Panel; Future  -     Vitamin D 25 Hydroxy; Future  -     PTH, Intact; Future  -     Phosphorus; Future  -     Magnesium; Future  -     Calcium, 24 HR Urine; Future    H/O gastric bypass  -     Comprehensive Metabolic Panel; Future  -     Vitamin D 25 Hydroxy; Future  -     PTH, Intact; Future  -     Phosphorus; Future  -     Magnesium; Future  -     Calcium, 24 HR Urine; Future  -     DEXA bone density peripheral; Future  -     DEXA BONE DENSITY AXIAL SKELETON; Future    Vitamin D deficiency  -     Comprehensive Metabolic Panel; Future  -     Vitamin D 25 Hydroxy; Future  -     PTH, Intact; Future  -     Phosphorus; Future  -     Magnesium; Future  -     Calcium, 24 HR Urine; Future    Screening for osteoporosis  -     Comprehensive Metabolic Panel; Future  -     Vitamin D 25 Hydroxy; Future  -     PTH, Intact; Future  -     Phosphorus; Future  -     Magnesium; Future  -     Calcium, 24 HR Urine; Future  -     DEXA bone density peripheral; Future        1: Hypercalcemia   Hyperparathyroidism   H/o gastric bypass     Ma 2022: After stopping HCTZ for 2 weeks, Ca 9.3, , Vit D 41, 24 hour urine calcium 54-May 2022     Keep HCTZ 25 mg daily for now   May cut down the dose if calcium goes way up     For secondary hyperparathyroidism, add calcium 500 mg bid      2: Postmenopausal osteopenia   May 2022:  BMD normal at LS and hips, -1.5 at distal forearm   I calculated FRAX and it is low   Repeat DXA scan in 2-3 years     2: Vit D   41 - May 2022   C/w Vit D gummies    CMP, PTH, Vit D today and before next visit       RTC in 5-6 months        Electronically signed by Cherelle English MD on 5/10/2022 at 2:39 PM

## 2022-08-20 DIAGNOSIS — M48.00 MULTILEVEL SPINAL STENOSIS: ICD-10-CM

## 2022-08-20 DIAGNOSIS — M48.061 DEGENERATIVE LUMBAR SPINAL STENOSIS: ICD-10-CM

## 2022-08-20 DIAGNOSIS — M94.251 CHONDROMALACIA OF HIP, RIGHT: ICD-10-CM

## 2022-08-22 RX ORDER — CELECOXIB 200 MG/1
200 CAPSULE ORAL DAILY PRN
Qty: 30 CAPSULE | Refills: 1 | Status: SHIPPED
Start: 2022-08-22 | End: 2022-09-23 | Stop reason: SDUPTHER

## 2022-08-23 ENCOUNTER — OFFICE VISIT (OUTPATIENT)
Dept: ORTHOPEDIC SURGERY | Age: 57
End: 2022-08-23
Payer: COMMERCIAL

## 2022-08-23 VITALS — HEIGHT: 63 IN | BODY MASS INDEX: 35.43 KG/M2 | WEIGHT: 199.96 LBS

## 2022-08-23 DIAGNOSIS — M25.859 FEMORAL ACETABULAR IMPINGEMENT: ICD-10-CM

## 2022-08-23 DIAGNOSIS — M16.31 OSTEOARTHRITIS RESULTING FROM RIGHT HIP DYSPLASIA: Primary | ICD-10-CM

## 2022-08-23 DIAGNOSIS — M48.061 SPINAL STENOSIS OF LUMBAR REGION WITHOUT NEUROGENIC CLAUDICATION: ICD-10-CM

## 2022-08-23 DIAGNOSIS — M48.00 MULTILEVEL SPINAL STENOSIS: ICD-10-CM

## 2022-08-23 PROCEDURE — 20611 DRAIN/INJ JOINT/BURSA W/US: CPT | Performed by: INTERNAL MEDICINE

## 2022-08-23 PROCEDURE — 99213 OFFICE O/P EST LOW 20 MIN: CPT | Performed by: INTERNAL MEDICINE

## 2022-08-23 RX ORDER — METHYLPREDNISOLONE ACETATE 40 MG/ML
40 INJECTION, SUSPENSION INTRA-ARTICULAR; INTRALESIONAL; INTRAMUSCULAR; SOFT TISSUE ONCE
Status: COMPLETED | OUTPATIENT
Start: 2022-08-23 | End: 2022-08-23

## 2022-08-23 RX ORDER — BUPIVACAINE HYDROCHLORIDE 2.5 MG/ML
5 INJECTION, SOLUTION INFILTRATION; PERINEURAL ONCE
Status: COMPLETED | OUTPATIENT
Start: 2022-08-23 | End: 2022-08-23

## 2022-08-23 RX ORDER — ROPIVACAINE HYDROCHLORIDE 5 MG/ML
3 INJECTION, SOLUTION EPIDURAL; INFILTRATION; PERINEURAL ONCE
Status: COMPLETED | OUTPATIENT
Start: 2022-08-23 | End: 2022-08-23

## 2022-08-23 RX ORDER — LIDOCAINE HYDROCHLORIDE 10 MG/ML
5 INJECTION, SOLUTION INFILTRATION; PERINEURAL ONCE
Status: COMPLETED | OUTPATIENT
Start: 2022-08-23 | End: 2022-08-23

## 2022-08-23 RX ADMIN — LIDOCAINE HYDROCHLORIDE 5 ML: 10 INJECTION, SOLUTION INFILTRATION; PERINEURAL at 15:46

## 2022-08-23 RX ADMIN — ROPIVACAINE HYDROCHLORIDE 3 ML: 5 INJECTION, SOLUTION EPIDURAL; INFILTRATION; PERINEURAL at 15:46

## 2022-08-23 RX ADMIN — BUPIVACAINE HYDROCHLORIDE 12.5 MG: 2.5 INJECTION, SOLUTION INFILTRATION; PERINEURAL at 15:45

## 2022-08-23 RX ADMIN — METHYLPREDNISOLONE ACETATE 40 MG: 40 INJECTION, SUSPENSION INTRA-ARTICULAR; INTRALESIONAL; INTRAMUSCULAR; SOFT TISSUE at 15:46

## 2022-08-23 NOTE — PROGRESS NOTES
Chief Complaint:   Chief Complaint   Patient presents with    Hip Pain     right, pain in hip/buttock into thigh is about the same as before, recently had a day where she felt popping in R groin with walking, then a \"big pop\" caused popping to stop and R groin/adductor soreness after; pain is worse at night, up to 10/10, TR EMG          History of Present Illness:       Patient is a 61 y.o. female returns follow up for the above complaint. The patient was last seen approximately 2 monthsago. The symptoms are mildly improved since the last visit. The patient has had further testing for the problem. EMG completed in the interim which is outlined below in detail. No evidence of radiculopathy or peripheral neuropathy. She has no active back pain at this time. Back pain to leg pain ratio 0:100. Her current symptoms do not follow a typical neurogenic claudication pattern    Majority of pain localized to the thigh occasional radiation below the knee the pain is aching and not neuritic in quality. Previous work-up is included x-rays of the right hip which were reviewed today. Past Medical History:        Past Medical History:   Diagnosis Date    High calcium levels     Hypertension     resolved after gastric bypass    Radiation thyroiditis     Thyroid disease         Present Medications:         Current Outpatient Medications   Medication Sig Dispense Refill    celecoxib (CELEBREX) 200 MG capsule Take 1 capsule by mouth daily as needed for Pain 30 capsule 1    calcium-vitamin D (OSCAL 500/200 D-3) 500-200 MG-UNIT per tablet Take 1 tablet by mouth in the morning.  30 tablet 3    diclofenac sodium (VOLTAREN) 1 % GEL APPLY FOUR GRAM TOPICALLY  FOUR TIMES A DAY AS NEEDED FOR PAIN 100 g 0    hydroCHLOROthiazide (HYDRODIURIL) 25 MG tablet TAKE ONE TABLET BY MOUTH DAILY 90 tablet 1    levothyroxine (SYNTHROID) 50 MCG tablet TAKE ONE TABLET BY MOUTH DAILY 90 tablet 1    lisinopril (PRINIVIL;ZESTRIL) 20 MG tablet TAKE ONE TABLET BY MOUTH DAILY 90 tablet 1    triamcinolone (KENALOG) 0.1 % cream Apply topically 2 times daily. 80 g 3    FLUoxetine (PROZAC) 20 MG tablet Take 1 tablet by mouth daily 90 tablet 3    fluticasone (FLONASE) 50 MCG/ACT nasal spray 1 spray by Nasal route daily       fluocinonide (LIDEX) 0.05 % cream Apply topically as needed       omeprazole (PRILOSEC) 20 MG delayed release capsule Take 20 mg by mouth as needed        No current facility-administered medications for this visit. Allergies: Allergies   Allergen Reactions    Morphine Itching           Review of Systems:    Pertinent items are noted in HPI        Vital Signs: There were no vitals filed for this visit. General Exam:     Constitutional: Patient is adequately groomed with no evidence of malnutrition    Physical Exam: right hip      Primary Exam:    Inspection: No deformity atrophy appreciable effusion      Palpation: No focal tenderness      Range of Motion: 90 degrees / 30 degrees / 55 degrees pain with internal rotation. In flexion      Strength: Near normal with SLR      Special Tests: Stinchfield suggestive, FADIR positive for pain      Skin: There are no rashes, ulcerations or lesions.       Gait: Nonantalgic    Neurovascular - non focal and intact     Additional Comments:        Additional Examinations:                    Office Imaging Results/Procedures PerformedToday:           Office Procedures:     Orders Placed This Encounter   Procedures    US GUIDED NEEDLE PLACEMENT     Standing Status:   Future     Number of Occurrences:   1     Standing Expiration Date:   8/23/2023     Order Specific Question:   Reason for exam:     Answer:   [facility]     Referral Priority:   Routine     Referral Type:   Eval and Treat     Referral Reason:   Specialty Services Required     Requested Specialty:   Physical Therapist     Number of Visits Requested:   1       Ultrasound-guided intra-articular hip injection  Log[employer] E ultrasound  4 MHz-5MHz    The patient was positioned supine on examination table and the    RT    lower extremity was slightly abducted. The curvilinear probe was positioned in the medial groin region transversely. Diagnostic ultrasound was performed verifying the position of the neurovascular bundle in short axis with [place] confirmation. The probe was then translated laterally and the anterior surface of the femoral head was visualized. The probe was then rotated to coronal oblique position and the femoral acetabular joint and anterior joint recess at the femoral head/neck junction was visualized. The femoral head neck junction was visualized at approximately 6 cm. Image optimization was obtained    The position of the probe was marked. Sterile preparation was performed. The subcutaneous and muscular tissues were anesthetized using 25-gauge 4 cm needle advanced under direct guidance using 5 cc 1% Lidocaine. The needle was then exchanged for a 22-gauge spinal needle and this was advanced in the same needle track under direct guidance using longitudinal technique to the anterior joint recess using 0.25 % marcaine. .  A total of 5cc 1% Lidocaine /2cc of 0.25% Marcaine was utilized. The syringe was exchanged and under direct guidance 1 mL of  Depo-Medrol 3 mL of 0.5% ropivacaine was injected into the anterior joint recess at the femoral acetabular joint. The anterior capsule was visualized to hydrodissect. Needle was withdrawn pressure applied to the puncture wound. Images stored    Technically successful intra-articular injection of the hip     Positive anesthetic response  Sarah Walsh M.D.   [facility]  Board Certified in [address]     EMG / NERVE CONDUCTION STUDY        PATIENT:  Yvette Teran        DATE OF EM22      YOB: 1965 REASON FOR EMG:   Right leg pain rule out radiculopathy        REFERRING PHYSICIAN:  MD Marcelino Patel [address]      SUMMARY:   The right peroneal motor nerve study was normal.  The right posterior tibial motor nerve study was normal.  The right sural sensory nerve study was normal.  Needle EMG of several muscles in the right lower extremity was normal.  Needle EMG of the right lumbar paraspinal muscles was normal.        CLINICAL DIAGNOSIS:  Lumbar radiculopathy           EMG RESULTS:   This is a normal EMG and nerve conduction study of the right lower extremity. There is no electrophysiological evidence for lumbar radiculopathy in this study. ---------------------------------------------  Thomas Price M.D. Electromyographer / Neurologist         Instructions    Verbal consent was obtained from patient and/or patient's advocate for in office procedure with Dr. Thomas Price (EMG or EEG). Other Outside Imaging and Testing Personally Reviewed:    No results found. Assessment   Impression: . Encounter Diagnoses   Name Primary? 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   Proceed as outlined above     Orders:        Orders Placed This Encounter   Procedures    US GUIDED NEEDLE PLACEMENT     Standing Status:   Future     Number of Occurrences:   1     Standing Expiration Date:   8/23/2023     Order Specific Question:   Reason for exam:     Answer:   [address] [place] [address]  Referral Priority:   Routine     Referral Type:   Eval and Treat     Referral Reason:   Specialty Services Required     Requested Specialty:   Physical Therapist     Number of Visits Requested:   1         Leighton Varela MD.      Anderson Mask: \"This note was dictated with voice recognition software. Though review and correction are routine, we apologize for any errors. \"

## 2022-09-06 ENCOUNTER — HOSPITAL ENCOUNTER (OUTPATIENT)
Dept: PHYSICAL THERAPY | Age: 57
Setting detail: THERAPIES SERIES
Discharge: HOME | End: 2022-09-06
Payer: COMMERCIAL

## 2022-09-06 DIAGNOSIS — M25.651 DECREASED RANGE OF MOTION OF BOTH HIPS: Primary | ICD-10-CM

## 2022-09-06 DIAGNOSIS — M25.551 PAIN IN JOINT OF RIGHT HIP: ICD-10-CM

## 2022-09-06 DIAGNOSIS — R29.898 DECREASED STRENGTH OF LOWER EXTREMITY: ICD-10-CM

## 2022-09-06 DIAGNOSIS — M25.652 DECREASED RANGE OF MOTION OF BOTH HIPS: Primary | ICD-10-CM

## 2022-09-06 PROCEDURE — 97161 PT EVAL LOW COMPLEX 20 MIN: CPT

## 2022-09-06 PROCEDURE — 97110 THERAPEUTIC EXERCISES: CPT

## 2022-09-06 NOTE — PLAN OF CARE
[address] [address], [address]  Phone: [phone]   Fax: [phone]                                                       Physical Therapy Certification    Dear Girish Carrasco, Angelo Marie*  ,    We had the pleasure of evaluating the following patient for physical therapy services at [address] [place] [address]. A summary of our findings can be found in the initial assessment below. This includes our plan of care. If you have any questions or concerns regarding these findings, please do not hesitate to contact me at the office phone number checked above. Thank you for the referral.       Physician Signature:_______________________________Date:__________________  By signing above (or electronic signature), therapists plan is approved by physician      Patient: Carolin Becker   : 1965   MRN: [id number]  Referring Physician: Dayana Archibald*        Evaluation Date: 2022      Medical Diagnosis Information:  Unilateral osteoarthritis resulting from hip dysplasia, right hip [M16.31]  Other specified joint disorders, unspecified hip [M25.859]   PT diagnosis:     ICD-10-CM    1. Decreased range of motion of both hips  M25.651     M25.652       2. Pain in joint of right hip  M25.551       3. Decreased strength of lower extremity  R29.898                                               Insurance information: PT Insurance Information: [place] [employer]     Precautions/ Contra-indications: Lumbar Stenosis, fibromyalgia  Latex Allergy:  [x]NO      []YES  Preferred Language for Healthcare:   [x]English       []Other:    C-SSRS Triggered by Intake questionnaire (Past 2 wk assessment ):   [x] No, Questionnaire did not trigger screening.   [] Yes, Patient intake triggered C-SSRS Screening     [] Completed, no further action required.    [] Completed, PCP notified via Epic    SUBJECTIVE: Patient stated complaint: Andres Woodall states that she has been having pain in right leg for about 2 years. Started having trouble sleeping at night, cannot sleep on right side at all. Pain goes from hip down front to the knee, and sometimes down to the ankle. Received cortisone injection to hip about a week ago. Ex Rx in past but hurt to do the exercises. After shot has improved 40-50%. Andres Woodall states that she still wakes up ~3x/night from the pain. She switches from her back to her left side. IS starting to feel pain when sleeping on her left. Will feel popping when walking on occasion. Knee hurts most right now. Celebrex only if she needs it. 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 Relevant Medical History:Spinal stenosis, fibromyalgia. Functional Outcome: FOTO physical FS primary measure score = 74; Risk adjusted = 54    Pain Scale: 0-2-7/10  Easing factors: Tylenol, change position/move around. Provocative factors: Prolonged standing, walking, sitting. Type: []Constant   [x]Intermittent  []Radiating []Localized []other:     Numbness/Tingling: Right leg    Occupation/School: [employer] Full time/Part time depending. Living Status/Prior Level of Function:Prior to this injury / incident, pt was independent with ADLs and IADLs,      OBJECTIVE:   Palpation: Sensitive over Greater trochanter (\"skin sensitive\"), TTP gluteal max/min/med, piriformis. Lumbar paraspinals. Functional Mobility/Transfers: dysfunctional, painful due to weak posterior chain. Posture: increased lumbar lordosis, genu recurvatum    Bandages/Dressings/Incisions: none    Gait: (include devices/WB status) trendelenburg    Dermatomes Normal Abnormal Comments   inguinal area (L1)   x All slightly diminished to light touch on right.    anterior mid-thigh (L2)  x    distal ant thigh/med knee (L3)  x    medial lower leg and foot (L4)  x    lateral lower leg and foot (L5)  x posterior calf (S1)  x    medial calcaneus (S2)  x        Reflexes Normal Abnormal Comments   S1-2 Seated achilles      S1-2 Prone knee bend      L3-4 Patellar tendon  x 1+ right   Clonus      Babinski        Lumbar AROM screen: [] WFL  [x] abnormal:  Flexion 50 degrees (slight increase in pain centrally)  Extension 30 degrees (increase in n/t down leg)     PROM AROM    L R L R   Hip Flexion   70 83 (pain hip)   Hip Abduction       Hip ER   65 65   Hip IR   20 15   [facility]nee [facility]   [facility]   Knee Extension   [facility]   Dorsiflexion        Plantarflexion        Inversion        Eversion            Strength (0-5) / Myotomes Left Right   Hip Flexion - supine     Hip Flexion - seated (L1-2) 4 4   Hip Ext 4- 4-   Hip Abduction 4- 3+   Hip Adduction     Hip ER 5 4   Hip IR 5 4   Quads (L2-4) 5 5   Hamstrings 5 5   Ankle Dorsiflexion (L4-5) 5 4+   Ankle Plantarflexion (S1-2)     Ankle Inversion     Ankle Eversion (S1-2)     Great Toe Extension (L5) 5 4+        Flexibility     Hamstrings (90/90)     ITB (Taj)     Quads (Ely's)     Hip Flexor (Maurice)          Girth (cm)     Mid patella     Suprapatellar     Figure 8     Transmalleolar     Metatarsal Heads     Burning sensation after MMT in posterolateral hip. Joint mobility:    [x]Normal    []Hypo   []Hyper    Orthopaedic Special Tests  Positive  Negative  NT Comments    Hip       LEEANNE / Ham's  x     FADIR x      Scour  x     Trendelenburg              Knee       Lachman's / Anterior Drawer       Posterior Drawer       Varus Stress       Valgus Stress       Daljit's        Appley's       Thessaly's       Patellar Tracking              Ankle       Anterior Drawer       Talar Tilt       White       Marianna's                   Balance: NV                         [x] Patient history, allergies, meds reviewed. Medical chart reviewed. See intake form.      Review Of Systems (ROS):  [x]Performed Review of systems (Integumentary, CardioPulmonary, Neurological) by intake and observation. Intake form has been scanned into medical record. Patient has been instructed to contact their primary care physician regarding ROS issues if not already being addressed at this time.       Co-morbidities/Complexities (which will affect course of rehabilitation):   []None        []Hx of COVID   Arthritic conditions   []Rheumatoid arthritis (M05.9)  []Osteoarthritis (M19.91)  []Gout   Cardiovascular conditions   [x]Hypertension (I10)  []Hyperlipidemia (E78.5)  []Angina pectoris (I20)  []Atherosclerosis (I70)  []Pacemaker  []Hx of CABG/stent/  cardiac surgeries   Musculoskeletal conditions   []Disc pathology   []Congenital spine pathologies   []Osteoporosis (M81.8)  []Osteopenia (M85.8)  []Scoliosis       Endocrine conditions   []Hypothyroid (E03.9)  []Hyperthyroid Gastrointestinal conditions   []Constipation (M03.41)   Metabolic conditions   []Morbid obesity (E66.01)  []Diabetes type 1(E10.65) or 2 (E11.65)   []Neuropathy (G60.9)     Cardio/Pulmonary conditions   []Asthma (J45)  []Coughing   []COPD (J44.9)  []CHF  []A-fib   Psychological Disorders  [x]Anxiety (F41.9)  [x]Depression (F32.9)   []Other:   Developmental Disorders  []Autism (F84.0)  []CP (G80)  []Down Syndrome (Q90.9)  []Developmental delay     Neurological conditions  []Prior Stroke (I69.30)  []Parkinson's (G20)  []Encephalopathy (G93.40)  []MS (G35)  []Post-polio (G14)  []SCI  []TBI  []ALS Other conditions  []Fibromyalgia (M79.7)  []Vertigo  []Syncope  []Kidney Failure  []Cancer      []currently undergoing                treatment  []Pregnancy  []Incontinence   Prior surgeries  []involved limb  []previous spinal surgery  [] section birth  []hysterectomy  []bowel / bladder surgery  []other relevant surgeries   []Other:              Barriers to/and or personal factors that will affect rehab potential:              [x]Age  [x]Sex    []Smoker              []Motivation/Lack of Motivation                        [x]Co-Morbidities []Cognitive Function, education/learning barriers              []Environmental, home barriers              []profession/work barriers  []past PT/medical experience  [x]other:  Justification: chronicity    Falls Risk Assessment (30 days):   [x] Falls Risk assessed and no intervention required. [] Falls Risk assessed and Patient requires intervention due to being higher risk   TUG score (>12s at risk):     [] Falls education provided, including        ASSESSMENT: Jose Ramon is a 60 y/o Female who presents to the clinic with 2 years symptoms of right leg pain starting from hip and radiating to knee, occasionally ankle. Difficulty with prolonged positions (standing, sitting) PMH including fibromyalgia and lumbar stenosis. 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 . Overall, physical therapy evaluation is consistent with referring diagnosis of femoral impingement with additional symptoms related to PMH lumbar stenosis. The patient will benefit from skilled physical therapy interventions including manual interventions, progressive strengthening, and balance in order to improve functional and participation restrictions. This session focused on improving lower extremity/posterolateral hip strength. Plan to include manual interventions to hip (long axis distraction/Lateral distraction) nv. .    Functional Impairments:     []Noted lumbar/proximal hip/LE joint hypomobility   [x]Decreased LE functional ROM   [x]Decreased core/proximal hip strength and neuromuscular control   [x]Decreased LE functional strength   []Reduced balance/proprioceptive control   []other:      Functional Activity Limitations (from functional questionnaire and intake)   [x]Reduced ability to tolerate prolonged functional positions   [x]Reduced ability or difficulty with changes of positions or transfers between positions   [x]Reduced ability to maintain good posture and demonstrate good body mechanics with sitting, bending, and lifting   [x]Reduced ability to sleep   [] Reduced ability or tolerance with driving and/or computer work   []Reduced ability to perform lifting, carrying tasks   [x]Reduced ability to squat   []Reduced ability to forward bend   [x]Reduced ability to ambulate prolonged functional periods/distances/surfaces   []Reduced ability to ascend/descend stairs   []Reduced ability to run, hop, cut or jump   []other:    Participation Restrictions   []Reduced participation in self care activities   [x]Reduced participation in home management activities   [x]Reduced participation in work activities   []Reduced participation in social activities. [x]Reduced participation in sport/recreation activities. Classification :    []Signs/symptoms consistent with post-surgical status including decreased ROM, strength and function.    []Signs/symptoms consistent with joint sprain/strain   []Signs/symptoms consistent with patella-femoral syndrome   []Signs/symptoms consistent with knee OA/hip OA   [x]Signs/symptoms consistent with internal derangement of knee/Hip   [x]Signs/symptoms consistent with functional hip weakness/NMR control      []Signs/symptoms consistent with tendinitis/tendinosis    []signs/symptoms consistent with pathology which may benefit from Dry needling      []other:      Prognosis/Rehab Potential:      []Excellent   [x]Good    []Fair   []Poor    Tolerance of evaluation/treatment:    [x]Excellent   []Good    []Fair   []Poor    Physical Therapy Evaluation Complexity Justification  [x] A history of present problem with:  [] no personal factors and/or comorbidities that impact the plan of care;  [x]1-2 personal factors and/or comorbidities that impact the plan of care  []3 personal factors and/or comorbidities that impact the plan of care  [x] An examination of body systems using standardized tests and measures addressing any of the following: body structures and functions (impairments), activity limitations, and/or participation restrictions;:  [x] a total of 1-2 or more elements   [] a total of 3 or more elements   [] a total of 4 or more elements   [x] A clinical presentation with:  [x] stable and/or uncomplicated characteristics   [] evolving clinical presentation with changing characteristics  [] unstable and unpredictable characteristics;   [x] Clinical decision making of [x] Low, [] moderate, [] high complexity using standardized patient assessment instrument and/or measurable assessment of functional outcome. [x] EVAL (LOW) 05719 (typically 15 minutes face-to-face)  [] EVAL (MOD) 83294 (typically 30 minutes face-to-face)  [] EVAL (HIGH) 90011 (typically 45 minutes face-to-face)  [] RE-EVAL     PLAN:   Frequency/Duration:  1-2 days per week for 8 Weeks:  Interventions:  [x]  Therapeutic exercise including: strength training, ROM, for Lower extremity and core   [x]  NMR activation and proprioception for LE, Glutes and Core   [x]  Manual therapy as indicated for LE, Hip and spine to include: Dry Needling/IASTM, STM, PROM, Gr I-IV mobilizations, manipulation. [x] Modalities as needed that may include: thermal agents, E-stim, Biofeedback, US, iontophoresis as indicated  [x] Patient education on joint protection, postural re-education, activity modification, progression of HEP. HEP instruction: Written HEP instructions provided and reviewed. Access Code: [email or web address]  URL: [email or web address]  Date: 09/06/2022  Prepared by: Rodney Culver    Exercises  Sidelying Hip Abduction - 1-2 x daily - 5-7 x weekly - 3 sets - 10-51 reps  Clamshell - 1-2 x daily - 5-7 x weekly - 3 sets - 10-15 reps  Hip Flexor Stretch on Step - 1-2 x daily - 5-7 x weekly - 3 sets - 30\" hold  Standing Hip Extension with Counter Support - 1-2 x daily - 5-7 x weekly - 3 sets - 10-15 reps    GOALS:  Patient stated goal: Sleep better  [] Progressing: [] Met: [] Not Met: [] Adjusted    Therapist goals for Patient:   Short Term Goals: To be achieved in: 2 weeks  1. Independent in HEP and progression per patient tolerance, in order to prevent re-injury. [] Progressing: [] Met: [] Not Met: [] Adjusted  2. Patient will have a decrease in pain to facilitate improvement in movement, function, and ADLs as indicated by Functional Deficits. [] Progressing: [] Met: [] Not Met: [] Adjusted    Long Term Goals: To be achieved in: 8 weeks  1. FOTO score of at least 95 to assist with reaching prior level of function. [] Progressing: [] Met: [] Not Met: [] Adjusted  2. Patient will demonstrate increased Hip AROM to IR 40, Flexion 90, Extension 15 to allow for proper joint functioning as indicated by patients Functional Deficits. [] Progressing: [] Met: [] Not Met: [] Adjusted  3. Patient will demonstrate an increase in Strength to at least 4+/5 Bilateral Hip as well as good proximal hip strength and control to allow for proper functional mobility as indicated by patients Functional Deficits. [] Progressing: [] Met: [] Not Met: [] Adjusted  4. Patient will return to functional activities including Sleeping throughout the night with no waking events and without increased symptoms or restriction. [] Progressing: [] Met: [] Not Met: [] Adjusted  5.  Patient will return to functional activities including standing for >1 hour at a time without increased symptoms or restriction in order to demonstrate ability to continue work as a xie.(patient specific functional goal)    [] Progressing: [] Met: [] Not Met: [] Adjusted     Electronically signed by:  Aminata Islas, PT DPT, MS

## 2022-09-06 NOTE — FLOWSHEET NOTE
[address]  Phone: [phone]   Fax: [phone]    Physical Therapy Daily Treatment Note    Date:  2022     Patient Name:  Margy Blackman    :  1965  MRN: [id number]  Medical Diagnosis:  Unilateral osteoarthritis resulting from hip dysplasia, right hip [M16.31]  Other specified joint disorders, unspecified hip [M25.859]  Treatment Diagnosis:     ICD-10-CM    1. Decreased range of motion of both hips  M25.651     M25.652       2. Pain in joint of right hip  M25.551       3. Decreased strength of lower extremity  R29.898           Insurance/Certification information:  PT Insurance Information: [facility]  Physician Information:  Joe Rodas[address] signed (Y/N): []  Yes [x]  No     Date of Patient follow up with Physician:      Progress Report: [x]  Yes  []  No     Date Range for reporting period:  Beginnin2022  Ending:     Progress report due (10 Rx/or 30 days whichever is less): visit #10 or 6718 (date)     Recertification due (POC duration/ or 90 days whichever is less): visit #16 or 2022 (date)     Visit # Insurance Allowable Auth required? Date Range   1 39 HARD []  Yes  [x]  No Calendar Year       Latex Allergy:  [x]NO      []YES  Preferred Language for Healthcare:   [x]English       []other:    Functional Scale:           Date assessed:  FOTO physical FS primary measure score = 74; risk adjusted = 54  2022    Pain level:  0-2-7/10     SUBJECTIVE:  See eval    OBJECTIVE: See eval      RESTRICTIONS/PRECAUTIONS: Lumbar stenosis, Fibromyalgia    Exercises/Interventions:     Therapeutic Exercise (44429)  Resistance / level Sets/sec Reps Notes / Cues   Recumbent bike     NV   Sidelying Hip Abduction  2 10-15    Clamshell.  Hip Flexor Stretch  2 10-15    Hip Extension off Table  2 10-15    Hip Flexor Stretch off Stair  3 30\"                                       Therapeutic Activities (20926) Neuromuscular Re-ed (19066)                            Manual Intervention ([id number])       Knee mobs/PROM       Tib/Fem Mobs       Patella Mobs       Ankle mobs                         Modalities:     Pt. Education:  -pt educated on diagnosis, prognosis and expectations for rehab  -all pt questions were answered    Home Exercise Program:  HEP instruction: Written HEP instructions provided and reviewed. Access Code: [email or web address]  URL: [email or web address]  Date: 09/06/2022  Prepared by: Peyman Edmond     Exercises  Sidelying Hip Abduction - 1-2 x daily - 5-7 x weekly - 3 sets - 10-51 reps  Clamshell - 1-2 x daily - 5-7 x weekly - 3 sets - 10-15 reps  Hip Flexor Stretch on Step - 1-2 x daily - 5-7 x weekly - 3 sets - 30\" hold  Standing Hip Extension with Counter Support - 1-2 x daily - 5-7 x weekly - 3 sets - 10-15 reps    Therapeutic Exercise and NMR EXR  [x] (58425) Provided verbal/tactile cueing for activities related to strengthening, flexibility, endurance, ROM for improvements in LE, proximal hip, and core control with self care, mobility, lifting, ambulation. [x] (17140) Provided verbal/tactile cueing for activities related to improving balance, coordination, kinesthetic sense, posture, motor skill, proprioception  to assist with LE, proximal hip, and core control in self care, mobility, lifting, ambulation and eccentric single leg control.   [] (74226) Therapist is in constant attendance of 2 or more patients providing skilled therapy interventions, but not providing any significant amount of measurable one-on-one time to either patient, for improvements in LE, proximal hip, and core control in self care, mobility, lifting, ambulation and eccentric single leg control.      NMR and Therapeutic Activities:    [x] (35028 or 79822) Provided verbal/tactile cueing for activities related to improving balance, coordination, kinesthetic sense, posture, motor skill, proprioception and motor activation to allow for proper function of core, proximal hip and LE with self care and ADLs  [] (41926) Gait Re-education- Provided training and instruction to the patient for proper LE, core and proximal hip recruitment and positioning and eccentric body weight control with ambulation re-education including up and down stairs     Home Exercise Program:    [x] (79260) Reviewed/Progressed HEP activities related to strengthening, flexibility, endurance, ROM of core, proximal hip and LE for functional self-care, mobility, lifting and ambulation/stair navigation   [] (70109)Reviewed/Progressed HEP activities related to improving balance, coordination, kinesthetic sense, posture, motor skill, proprioception of core, proximal hip and LE for self care, mobility, lifting, and ambulation/stair navigation      Manual Treatments:  PROM / STM / Oscillations-Mobs:  G-I, II, III, IV (PA's, Inf., Post.)  [] (67275) Provided manual therapy to mobilize LE, proximal hip and/or LS spine soft tissue/joints for the purpose of modulating pain, promoting relaxation,  increasing ROM, reducing/eliminating soft tissue swelling/inflammation/restriction, improving soft tissue extensibility and allowing for proper ROM for normal function with self care, mobility, lifting and ambulation. Modalities:  [] (53767) Vasopneumatic compression: Utilized vasopneumatic compression to decrease edema / swelling for the purpose of improving mobility and quad tone / recruitment which will allow for increased overall function including but not limited to self-care, transfers, ambulation, and ascending / descending stairs.        Charges:  Timed Code Treatment Minutes: 20   Total Treatment Minutes: 50     [x] EVAL - LOW (76132)   [] EVAL - MOD (97549)  [] EVAL - HIGH (60982)  [] RE-EVAL (92823)  [x] GIAN(58177) x  1     [] Ionto  [] NMR (44537) x       [] Vaso  [] Manual (60684) x       [] Ultrasound  [] TA x        [] Mech Traction (42113)  [] Aquatic Therapy x     [] ES (un) (49120):   [] Home Management Training x  [] ES(attended) (21543)   [] Dry Needling 1-2 muscles (25884):  [] Dry Needling 3+ muscles (903277  [] Group:      [] Other:     GOALS:  Patient stated goal: Sleep better  [] Progressing: [] Met: [] Not Met: [] Adjusted     Therapist goals for Patient:   Short Term Goals: To be achieved in: 2 weeks  1. Independent in HEP and progression per patient tolerance, in order to prevent re-injury. [] Progressing: [] Met: [] Not Met: [] Adjusted  2. Patient will have a decrease in pain to facilitate improvement in movement, function, and ADLs as indicated by Functional Deficits. [] Progressing: [] Met: [] Not Met: [] Adjusted     Long Term Goals: To be achieved in: 8 weeks  1. FOTO score of at least 95 to assist with reaching prior level of function. [] Progressing: [] Met: [] Not Met: [] Adjusted  2. Patient will demonstrate increased Hip AROM to IR 40, Flexion 90, Extension 15 to allow for proper joint functioning as indicated by patients Functional Deficits. [] Progressing: [] Met: [] Not Met: [] Adjusted  3. Patient will demonstrate an increase in Strength to at least 4+/5 Bilateral Hip as well as good proximal hip strength and control to allow for proper functional mobility as indicated by patients Functional Deficits. [] Progressing: [] Met: [] Not Met: [] Adjusted  4. Patient will return to functional activities including Sleeping throughout the night with no waking events and without increased symptoms or restriction. [] Progressing: [] Met: [] Not Met: [] Adjusted  5.  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 [] Progression is slowed due to complexities/Impairments listed. [] Progression has been slowed due to co-morbidities. [x] Plan just implemented, too soon to assess goals progression <30days   [] Goals require adjustment due to lack of progress  [] Patient is not progressing as expected and requires additional follow up with physician  [] Other    Persisting Functional Limitations/Impairments:  [x]Sitting [x]Standing   [x]Walking []Stairs   [x]Transfers []ADLs   [x]Squatting/bending []Kneeling  []Housework [x]Job related tasks  []Driving []Sports/Recreation   [x]Sleeping []Other:    ASSESSMENT:  See eval  Treatment/Activity Tolerance:  [x] Pt able to complete treatment [] Patient limited by fatique  [] Patient limited by pain  [] Patient limited by other medical complications  [] Other:     Prognosis:  [x] Good [] Fair  [] Poor    Patient Requires Follow-up: [x] Yes  [] No    Return to Play:    [x]  N/A   []  Stage 1: Intro to Strength   []  Stage 2: Return to Run and Strength   []  Stage 3: Return to Jump and Strength   []  Stage 4: Dynamic Strength and Agility   []  Stage 5: Sport Specific Training     []  Ready to Return to Play, Meets All Above Stages   []  Not Ready for Return to Sports   Comments:            PLAN: See eval. PT 1-2x / week for 8 weeks. [] Continue per plan of care [] Alter current plan (see comments)  [x] Plan of care initiated [] Hold pending MD visit [] Discharge    Electronically signed by: Zac Goncalves PT, DPT, MS      Note: If patient does not return for scheduled/ recommended follow up visits, this note will serve as a discharge from care along with most recent update on progress.

## 2022-09-13 ENCOUNTER — HOSPITAL ENCOUNTER (OUTPATIENT)
Dept: PHYSICAL THERAPY | Age: 57
Setting detail: THERAPIES SERIES
Discharge: HOME | End: 2022-09-13
Payer: COMMERCIAL

## 2022-09-13 NOTE — FLOWSHEET NOTE
[address]     Physical Therapy  Cancellation/No-show Note  Patient Name:  Dyan Gonzalez  :  1965   Date:  2022  Cancelled visits to date: 0  No-shows to date: 1    Patient status for today's appointment patient:  []  Cancelled  []  Rescheduled appointment  [x]  No-show      Reason given by patient:  []  Patient ill  []  Conflicting appointment  []  No transportation    []  Conflict with work  [x]  No reason given  []  Other:     Comments:      Phone call information:   [x]  Phone call made today to patient at 9:29am_ time at number provided:      []  Patient answered, conversation as follows:    [x]  Patient did not answer, message left as follows: Provided time of appointment, next appt, and phone number incase she needs to cancel or reschedule. []  Phone call not made today  []  Phone call not needed - pt contacted us to cancel and provided reason for cancellation.      Electronically signed by:  Ashely Gutierrez, PT DPT, MS

## 2022-09-20 ENCOUNTER — HOSPITAL ENCOUNTER (OUTPATIENT)
Dept: PHYSICAL THERAPY | Age: 57
Setting detail: THERAPIES SERIES
Discharge: HOME | End: 2022-09-20
Payer: COMMERCIAL

## 2022-09-20 ENCOUNTER — OFFICE VISIT (OUTPATIENT)
Dept: ORTHOPEDIC SURGERY | Age: 57
End: 2022-09-20
Payer: COMMERCIAL

## 2022-09-20 VITALS — WEIGHT: 199.96 LBS | BODY MASS INDEX: 36.8 KG/M2 | HEIGHT: 62 IN

## 2022-09-20 DIAGNOSIS — M25.859 FEMORAL ACETABULAR IMPINGEMENT: ICD-10-CM

## 2022-09-20 DIAGNOSIS — M16.31 OSTEOARTHRITIS RESULTING FROM RIGHT HIP DYSPLASIA: Primary | ICD-10-CM

## 2022-09-20 DIAGNOSIS — M48.061 SPINAL STENOSIS OF LUMBAR REGION WITHOUT NEUROGENIC CLAUDICATION: ICD-10-CM

## 2022-09-20 DIAGNOSIS — M48.00 MULTILEVEL SPINAL STENOSIS: ICD-10-CM

## 2022-09-20 PROCEDURE — 99214 OFFICE O/P EST MOD 30 MIN: CPT | Performed by: INTERNAL MEDICINE

## 2022-09-20 NOTE — FLOWSHEET NOTE
[address]     Physical Therapy  Cancellation/No-show Note  Patient Name:  Leesa Hamilton  :  1965   Date:  2022  Cancelled visits to date: 0  No-shows to date: 2    Patient status for today's appointment patient:  []  Cancelled  []  Rescheduled appointment  [x]  No-show ,      Reason given by patient:  []  Patient ill  []  Conflicting appointment  []  No transportation    []  Conflict with work  [x]  No reason given  []  Other:     Comments:      Phone call information:   []  Phone call made today to patient at 9:29am_ time at number provided:      []  Patient answered, conversation as follows:    []  Patient did not answer, message left as follows: Provided time of appointment, next appt, and phone number incase she needs to cancel or reschedule. [x]  Phone call not made today  []  Phone call not needed - pt contacted us to cancel and provided reason for cancellation.      Electronically signed by:  Mary Jo Clarke, PT DPT, MS

## 2022-09-23 RX ORDER — CELECOXIB 200 MG/1
200 CAPSULE ORAL DAILY PRN
Qty: 30 CAPSULE | Refills: 2 | Status: SHIPPED | OUTPATIENT
Start: 2022-09-23

## 2022-09-23 NOTE — PROGRESS NOTES
Chief Complaint:   Chief Complaint   Patient presents with    Lower Back Pain     Achy pain at times that tends to be more on the R side. Hip Pain     F/U after injection on 08/23/2022. Had only 1 day of relief and then the pain came back. Feels the pain could be coming from her back. History of Present Illness:       Patient is a 61 y.o. female returns follow up for the above complaint. The patient was last seen approximately 1 monthsago. The symptoms are worsening since the last visit. The patient has had no further testing for the problem. Status post ultrasound-guided CSI right hip. Hip pain has resurfaced after only short-lived therapeutic benefit from the aforementioned injection. She has discontinued Celebrex as well and pain levels are now increased to 8/10         Past Medical History:        Past Medical History:   Diagnosis Date    High calcium levels     Hypertension     resolved after gastric bypass    Radiation thyroiditis     Thyroid disease         Present Medications:         Current Outpatient Medications   Medication Sig Dispense Refill    celecoxib (CELEBREX) 200 MG capsule Take 1 capsule by mouth daily as needed for Pain 30 capsule 2    calcium-vitamin D (OSCAL 500/200 D-3) 500-200 MG-UNIT per tablet Take 1 tablet by mouth in the morning. 30 tablet 3    diclofenac sodium (VOLTAREN) 1 % GEL APPLY FOUR GRAM TOPICALLY  FOUR TIMES A DAY AS NEEDED FOR PAIN 100 g 0    hydroCHLOROthiazide (HYDRODIURIL) 25 MG tablet TAKE ONE TABLET BY MOUTH DAILY 90 tablet 1    levothyroxine (SYNTHROID) 50 MCG tablet TAKE ONE TABLET BY MOUTH DAILY 90 tablet 1    lisinopril (PRINIVIL;ZESTRIL) 20 MG tablet TAKE ONE TABLET BY MOUTH DAILY 90 tablet 1    triamcinolone (KENALOG) 0.1 % cream Apply topically 2 times daily.  80 g 3    FLUoxetine (PROZAC) 20 MG tablet Take 1 tablet by mouth daily 90 tablet 3    fluticasone (FLONASE) 50 MCG/ACT nasal spray 1 spray by Nasal route daily       fluocinonide (LIDEX) 0.05 % cream Apply topically as needed       omeprazole (PRILOSEC) 20 MG delayed release capsule Take 20 mg by mouth as needed        No current facility-administered medications for this visit. Allergies: Allergies   Allergen Reactions    Morphine Itching           Review of Systems:    Pertinent items are noted in HPI    Review of systems reviewed from Patient History Form dated on    and   available in the patient's chart under the Media tab. Vital Signs: There were no vitals filed for this visit. General Exam:     Constitutional: Patient is adequately groomed with no evidence of malnutrition    Physical Exam: right hip      Primary Exam:    Inspection: No deformity atrophy or appreciable effusion      Palpation: No focal tenderness      Range of Motion: Stable unchanged from previous      Strength: Normal with SLR      Special Tests: Stinchfield equivocal      Skin: There are no rashes, ulcerations or lesions. Gait: Nonantalgic      Neurovascular - non focal and intact       Additional Comments:        Additional Examinations:              Office Imaging Results/Procedures PerformedToday:          Office Procedures:     Orders Placed This Encounter   Procedures    [address] [facility]     Referral Priority:   Routine     Referral Type:   Eval and Treat     Referral Reason:   Specialty Services Required     Requested Specialty:   Physical Therapist     Number of Visits Requested:   1           Other Outside Imaging and Testing Personally Reviewed:     Latest Reference Range & Units 8/10/22 14:35   Sodium 136 - 145 mmol/L 145   Potassium 3.5 - 5.1 mmol/L 4.0   Chloride 99 - 110 mmol/L 105   CO2 21 - 32 mmol/L 28   BUN,BUNPL 7 - 20 mg/dL 21 (H)   Creatinine 0.6 - 1.1 mg/dL 1.1   Anion Gap 3 - 16  12   GFR Non- >60  51 !    GFR African American >60  >60   Glucose, Random 70 - 99 mg/dL 66 (L)   CALCIUM, SERUM, 019662 8.3 - 10.6 mg/dL 9.5 Total Protein 6.4 - 8.2 g/dL 6.5   (H): Data is abnormally high  !: Data is abnormal  (L): Data is abnormally low        Assessment   Impression: . Encounter Diagnoses   Name Primary? Osteoarthritis resulting from right hip dysplasia Yes    Femoral acetabular impingement     Spinal stenosis of lumbar region without neurogenic claudication     Multilevel spinal stenosis               Plan:     Resume Celebrex with GI precautions and renal precautions  Commence/start formal course of PT/aqua PT right hip conditioning  MRI  right hip evaluate for high-grade labral pathology and/or chondropathy  Consider ultrasound-guided biologic orthopedic injection right hip-[id number]      Low clinical suspicion that her pain is of neurogenic etiology at this time. Proceed as outlined above    Approximately 45 minutes was spent related to previewing pertinent medical documentation prior to the patient's visit along with counseling during the patient's visit with respect to treatment options inclusive of alternatives to treatment and the complications and risks related to those treatment options along with expectations of outcome related to those treatments and inclusive of time in the documentation and ordering of testing and treatment after the visit. Orders:        Orders Placed This Encounter   Procedures    [facility]     Referral Priority:   Routine     Referral Type:   Eval and Treat     Referral Reason:   Specialty Services Required     Requested Specialty:   Physical Therapist     Number of Visits Requested:   1         Jessie Kyle MD.      Frederick Sarmiento: \"This note was dictated with voice recognition software. Though review and correction are routine, we apologize for any errors. \"

## 2022-09-27 ENCOUNTER — APPOINTMENT (OUTPATIENT)
Dept: PHYSICAL THERAPY | Age: 57
End: 2022-09-27
Payer: COMMERCIAL

## 2022-10-06 DIAGNOSIS — E03.9 ACQUIRED HYPOTHYROIDISM: ICD-10-CM

## 2022-10-06 DIAGNOSIS — I10 ESSENTIAL HYPERTENSION, BENIGN: ICD-10-CM

## 2022-10-06 RX ORDER — HYDROCHLOROTHIAZIDE 25 MG/1
TABLET ORAL
Qty: 90 TABLET | Refills: 1 | Status: SHIPPED | OUTPATIENT
Start: 2022-10-06

## 2022-10-06 RX ORDER — LEVOTHYROXINE SODIUM 0.05 MG/1
TABLET ORAL
Qty: 90 TABLET | Refills: 1 | Status: SHIPPED | OUTPATIENT
Start: 2022-10-06

## 2022-10-17 ENCOUNTER — HOSPITAL ENCOUNTER (OUTPATIENT)
Dept: MRI IMAGING | Age: 57
Discharge: HOME | End: 2022-10-17
Payer: COMMERCIAL

## 2022-10-17 DIAGNOSIS — M25.859 FEMORAL ACETABULAR IMPINGEMENT: ICD-10-CM

## 2022-10-17 DIAGNOSIS — M16.31 OSTEOARTHRITIS RESULTING FROM RIGHT HIP DYSPLASIA: ICD-10-CM

## 2022-10-17 PROCEDURE — 73721 MRI JNT OF LWR EXTRE W/O DYE: CPT

## 2022-10-22 ENCOUNTER — TELEPHONE (OUTPATIENT)
Dept: INTERNAL MEDICINE CLINIC | Age: 57
End: 2022-10-22

## 2022-10-22 DIAGNOSIS — I10 ESSENTIAL HYPERTENSION, BENIGN: Primary | ICD-10-CM

## 2022-10-22 RX ORDER — LISINOPRIL 20 MG/1
20 TABLET ORAL DAILY
Qty: 30 TABLET | Refills: 0 | Status: SHIPPED | OUTPATIENT
Start: 2022-10-22

## 2022-10-23 NOTE — TELEPHONE ENCOUNTER
Called on-call physician for refill on lisinopril. 30-day sent to pharmacy. Will need additional refills.

## 2022-11-07 ENCOUNTER — OFFICE VISIT (OUTPATIENT)
Dept: INTERNAL MEDICINE CLINIC | Age: 57
End: 2022-11-07
Payer: COMMERCIAL

## 2022-11-07 VITALS
HEIGHT: 62 IN | DIASTOLIC BLOOD PRESSURE: 72 MMHG | WEIGHT: 200.6 LBS | BODY MASS INDEX: 36.91 KG/M2 | OXYGEN SATURATION: 97 % | SYSTOLIC BLOOD PRESSURE: 120 MMHG | HEART RATE: 71 BPM

## 2022-11-07 DIAGNOSIS — Z12.11 COLON CANCER SCREENING: ICD-10-CM

## 2022-11-07 DIAGNOSIS — E21.3 HYPERPARATHYROIDISM (HCC): ICD-10-CM

## 2022-11-07 DIAGNOSIS — E03.9 ACQUIRED HYPOTHYROIDISM: ICD-10-CM

## 2022-11-07 DIAGNOSIS — J30.89 ENVIRONMENTAL AND SEASONAL ALLERGIES: ICD-10-CM

## 2022-11-07 DIAGNOSIS — Z12.31 ENCOUNTER FOR SCREENING MAMMOGRAM FOR BREAST CANCER: ICD-10-CM

## 2022-11-07 DIAGNOSIS — Z11.59 NEED FOR HEPATITIS C SCREENING TEST: ICD-10-CM

## 2022-11-07 DIAGNOSIS — F41.9 ANXIETY: Primary | ICD-10-CM

## 2022-11-07 DIAGNOSIS — I10 ESSENTIAL HYPERTENSION, BENIGN: ICD-10-CM

## 2022-11-07 DIAGNOSIS — F32.A DEPRESSION, UNSPECIFIED DEPRESSION TYPE: ICD-10-CM

## 2022-11-07 PROCEDURE — 3078F DIAST BP <80 MM HG: CPT | Performed by: NURSE PRACTITIONER

## 2022-11-07 PROCEDURE — 99214 OFFICE O/P EST MOD 30 MIN: CPT | Performed by: NURSE PRACTITIONER

## 2022-11-07 PROCEDURE — 3074F SYST BP LT 130 MM HG: CPT | Performed by: NURSE PRACTITIONER

## 2022-11-07 RX ORDER — FLUOXETINE 20 MG/1
20 TABLET, FILM COATED ORAL DAILY
Qty: 90 TABLET | Refills: 3 | Status: SHIPPED | OUTPATIENT
Start: 2022-11-07 | End: 2022-11-30 | Stop reason: SDUPTHER

## 2022-11-07 RX ORDER — AZELASTINE 1 MG/ML
1 SPRAY, METERED NASAL 2 TIMES DAILY
Qty: 60 ML | Refills: 1 | Status: SHIPPED | OUTPATIENT
Start: 2022-11-07

## 2022-11-07 RX ORDER — LISINOPRIL 20 MG/1
20 TABLET ORAL DAILY
Qty: 90 TABLET | Refills: 3 | Status: SHIPPED | OUTPATIENT
Start: 2022-11-07

## 2022-11-07 RX ORDER — LEVOTHYROXINE SODIUM 0.05 MG/1
TABLET ORAL
Qty: 90 TABLET | Refills: 3 | Status: SHIPPED | OUTPATIENT
Start: 2022-11-07 | End: 2022-11-30 | Stop reason: SDUPTHER

## 2022-11-07 RX ORDER — HYDROCHLOROTHIAZIDE 25 MG/1
TABLET ORAL
Qty: 90 TABLET | Refills: 3 | Status: SHIPPED | OUTPATIENT
Start: 2022-11-07 | End: 2022-11-30 | Stop reason: SDUPTHER

## 2022-11-07 SDOH — ECONOMIC STABILITY: FOOD INSECURITY: WITHIN THE PAST 12 MONTHS, YOU WORRIED THAT YOUR FOOD WOULD RUN OUT BEFORE YOU GOT MONEY TO BUY MORE.: NEVER TRUE

## 2022-11-07 SDOH — ECONOMIC STABILITY: FOOD INSECURITY: WITHIN THE PAST 12 MONTHS, THE FOOD YOU BOUGHT JUST DIDN'T LAST AND YOU DIDN'T HAVE MONEY TO GET MORE.: NEVER TRUE

## 2022-11-07 ASSESSMENT — PATIENT HEALTH QUESTIONNAIRE - PHQ9
SUM OF ALL RESPONSES TO PHQ QUESTIONS 1-9: 0
SUM OF ALL RESPONSES TO PHQ QUESTIONS 1-9: 0
1. LITTLE INTEREST OR PLEASURE IN DOING THINGS: 0
2. FEELING DOWN, DEPRESSED OR HOPELESS: 0
SUM OF ALL RESPONSES TO PHQ9 QUESTIONS 1 & 2: 0
SUM OF ALL RESPONSES TO PHQ QUESTIONS 1-9: 0
SUM OF ALL RESPONSES TO PHQ QUESTIONS 1-9: 0

## 2022-11-07 ASSESSMENT — SOCIAL DETERMINANTS OF HEALTH (SDOH): HOW HARD IS IT FOR YOU TO PAY FOR THE VERY BASICS LIKE FOOD, HOUSING, MEDICAL CARE, AND HEATING?: NOT HARD AT ALL

## 2022-11-07 NOTE — PROGRESS NOTES
11/7/22     Chief Complaint   Patient presents with    Follow-up    Medication Refill    Sinus Problem     HPI    Here for follow up of HTN, taking medication as prescribed. Not checking BP at home consistently. Denies symptoms of end organ damage. Mood - has been controlled with fluoxetine. No longer taking celebrex for pain. Not helping like it used to help. Seeing endocrinology for hypercalcemia, hypothyroidism, hyperparathyroidism. Reports she started last week with sinus drainage, sinus pressure   Denies cough other than PND. Denies fever, chills, change in chronic fatigue or body aches, known exposures to flu or covid. Changed her antihistamine around last week and thinks this made it worse. Doing her nasal rinses which helps. Allergies   Allergen Reactions    Morphine Itching     Current Outpatient Medications   Medication Sig Dispense Refill    FLUoxetine (PROZAC) 20 MG tablet Take 1 tablet by mouth daily 90 tablet 3    hydroCHLOROthiazide (HYDRODIURIL) 25 MG tablet TAKE ONE TABLET BY MOUTH DAILY 90 tablet 3    levothyroxine (SYNTHROID) 50 MCG tablet TAKE ONE TABLET BY MOUTH DAILY 90 tablet 3    lisinopril (PRINIVIL;ZESTRIL) 20 MG tablet Take 1 tablet by mouth daily 90 tablet 3    calcium-vitamin D (OSCAL 500/200 D-3) 500-200 MG-UNIT per tablet Take 1 tablet by mouth daily 90 tablet 3    azelastine (ASTELIN) 0.1 % nasal spray 1 spray by Nasal route 2 times daily Use in each nostril as directed 60 mL 1    triamcinolone (KENALOG) 0.1 % cream Apply topically 2 times daily. 80 g 3    fluocinonide (LIDEX) 0.05 % cream Apply topically as needed       omeprazole (PRILOSEC) 20 MG delayed release capsule Take 20 mg by mouth as needed       diclofenac sodium (VOLTAREN) 1 % GEL APPLY FOUR GRAM TOPICALLY  FOUR TIMES A DAY AS NEEDED FOR PAIN (Patient not taking: Reported on 11/7/2022) 100 g 0     No current facility-administered medications for this visit.      Review of Systems  Negative other than HPI Continue nasal rinses. Restart flonase.   - azelastine (ASTELIN) 0.1 % nasal spray; 1 spray by Nasal route 2 times daily Use in each nostril as directed  Dispense: 60 mL; Refill: 1    Discussed medications with patient, who voiced understanding of their use and indications. All questions answered. No follow-ups on file.       Electronically signed by DEEPAK Kelley CNP on 11/7/2022 at 3:10 PM

## 2022-11-30 DIAGNOSIS — F32.A DEPRESSION, UNSPECIFIED DEPRESSION TYPE: ICD-10-CM

## 2022-11-30 DIAGNOSIS — E03.9 ACQUIRED HYPOTHYROIDISM: ICD-10-CM

## 2022-11-30 DIAGNOSIS — F41.9 ANXIETY: ICD-10-CM

## 2022-11-30 DIAGNOSIS — I10 ESSENTIAL HYPERTENSION, BENIGN: ICD-10-CM

## 2022-11-30 RX ORDER — LEVOTHYROXINE SODIUM 0.05 MG/1
TABLET ORAL
Qty: 90 TABLET | Refills: 3 | Status: SHIPPED | OUTPATIENT
Start: 2022-11-30

## 2022-11-30 RX ORDER — HYDROCHLOROTHIAZIDE 25 MG/1
TABLET ORAL
Qty: 90 TABLET | Refills: 3 | Status: SHIPPED | OUTPATIENT
Start: 2022-11-30

## 2022-11-30 RX ORDER — FLUOXETINE 20 MG/1
20 TABLET, FILM COATED ORAL DAILY
Qty: 90 TABLET | Refills: 3 | Status: SHIPPED | OUTPATIENT
Start: 2022-11-30

## 2023-01-19 ENCOUNTER — HOSPITAL ENCOUNTER (OUTPATIENT)
Dept: WOMENS IMAGING | Age: 58
Discharge: HOME | End: 2023-01-19
Payer: COMMERCIAL

## 2023-01-19 VITALS — HEIGHT: 63 IN | WEIGHT: 203 LBS | BODY MASS INDEX: 35.97 KG/M2

## 2023-01-19 DIAGNOSIS — Z12.31 ENCOUNTER FOR SCREENING MAMMOGRAM FOR BREAST CANCER: ICD-10-CM

## 2023-01-19 PROCEDURE — 77063 BREAST TOMOSYNTHESIS BI: CPT

## 2023-01-20 DIAGNOSIS — R92.8 ABNORMAL MAMMOGRAM OF LEFT BREAST: Primary | ICD-10-CM

## 2023-01-23 DIAGNOSIS — Z98.84 H/O GASTRIC BYPASS: ICD-10-CM

## 2023-01-23 DIAGNOSIS — Z12.11 COLON CANCER SCREENING: Primary | ICD-10-CM

## 2023-01-23 DIAGNOSIS — E21.3 HYPERPARATHYROIDISM (HCC): ICD-10-CM

## 2023-01-23 DIAGNOSIS — E83.52 HYPERCALCEMIA: ICD-10-CM

## 2023-01-23 LAB
A/G RATIO: 1.7 (ref 1.1–2.2)
ALBUMIN SERPL-MCNC: 4 G/DL (ref 3.4–5)
ALP BLD-CCNC: 84 U/L (ref 40–129)
ALT SERPL-CCNC: 14 U/L (ref 10–40)
ANION GAP SERPL CALCULATED.3IONS-SCNC: 10 MMOL/L (ref 3–16)
AST SERPL-CCNC: 16 U/L (ref 15–37)
BILIRUB SERPL-MCNC: <0.2 MG/DL (ref 0–1)
BUN BLDV-MCNC: 17 MG/DL (ref 7–20)
CALCIUM SERPL-MCNC: 9.7 MG/DL (ref 8.3–10.6)
CHLORIDE BLD-SCNC: 106 MMOL/L (ref 99–110)
CO2: 27 MMOL/L (ref 21–32)
CONTROL: NORMAL
CONTROL: NORMAL
CREAT SERPL-MCNC: 0.8 MG/DL (ref 0.6–1.1)
GFR SERPL CREATININE-BSD FRML MDRD: >60 ML/MIN/{1.73_M2}
GLUCOSE BLD-MCNC: 96 MG/DL (ref 70–99)
HEMOCCULT STL QL: NEGATIVE
HEMOCCULT STL QL: NEGATIVE
PARATHYROID HORMONE INTACT: 157.7 PG/ML (ref 14–72)
POTASSIUM SERPL-SCNC: 4.2 MMOL/L (ref 3.5–5.1)
SODIUM BLD-SCNC: 143 MMOL/L (ref 136–145)
TOTAL PROTEIN: 6.3 G/DL (ref 6.4–8.2)
VITAMIN D 25-HYDROXY: 34.4 NG/ML

## 2023-01-23 PROCEDURE — 82274 ASSAY TEST FOR BLOOD FECAL: CPT | Performed by: INTERNAL MEDICINE

## 2023-01-28 DIAGNOSIS — I10 ESSENTIAL HYPERTENSION, BENIGN: ICD-10-CM

## 2023-01-28 DIAGNOSIS — E21.3 HYPERPARATHYROIDISM (HCC): ICD-10-CM

## 2023-01-30 RX ORDER — LISINOPRIL 20 MG/1
20 TABLET ORAL DAILY
Qty: 90 TABLET | Refills: 1 | Status: SHIPPED | OUTPATIENT
Start: 2023-01-30

## 2023-02-04 DIAGNOSIS — F32.A DEPRESSION, UNSPECIFIED DEPRESSION TYPE: ICD-10-CM

## 2023-02-04 DIAGNOSIS — F41.9 ANXIETY: ICD-10-CM

## 2023-02-06 RX ORDER — FLUOXETINE 20 MG/1
TABLET, FILM COATED ORAL
Qty: 90 TABLET | Refills: 3 | OUTPATIENT
Start: 2023-02-06

## 2023-02-09 ENCOUNTER — OFFICE VISIT (OUTPATIENT)
Dept: ENDOCRINOLOGY | Age: 58
End: 2023-02-09

## 2023-02-09 VITALS
OXYGEN SATURATION: 97 % | HEIGHT: 63 IN | HEART RATE: 70 BPM | BODY MASS INDEX: 35.72 KG/M2 | SYSTOLIC BLOOD PRESSURE: 140 MMHG | WEIGHT: 201.6 LBS | DIASTOLIC BLOOD PRESSURE: 87 MMHG

## 2023-02-09 DIAGNOSIS — E83.52 HYPERCALCEMIA: Primary | ICD-10-CM

## 2023-02-09 DIAGNOSIS — E55.9 VITAMIN D DEFICIENCY: ICD-10-CM

## 2023-02-09 DIAGNOSIS — Z98.84 H/O GASTRIC BYPASS: ICD-10-CM

## 2023-02-09 DIAGNOSIS — R73.01 FASTING HYPERGLYCEMIA: ICD-10-CM

## 2023-02-09 DIAGNOSIS — E21.3 HYPERPARATHYROIDISM (HCC): ICD-10-CM

## 2023-02-09 NOTE — PROGRESS NOTES
Patient ID: Demetrice Esteves is a 61 y.o. female    Chief Complaint: hypercalcemia/hyperparathyroidism. HPI:   Demetrice Esteves is here for an evaluation of hypercalcemia/hyperparathyroidism. She had gastric bypass surgery for weight loss in Sep 2002   Received LOPEZ for hyperthyroidism around 2010   She is taking HCTZ 25 mg daily for a long time     Calcium was 11, high for the first time in Jan 2022  Ca 11.1, .8, Vit D 40 - March 2022     Interval history:     She started taking Ca 500 mg once a day     No change in symptoms   Still tired     She is unsteady due to fibromyalgia     No calcium intake   Vit D gummies : ran out of it     She is taking HCTZ 25 mg daily     Hyperglycemia   Two sisters are diabetic   104 at home , fasting       FOV:     She thinks she has memory issues and fogginess. She has muscle pains and joint pains, fibromyalgia. She has low energy levels. Patient is asymptomatic of polyuria, polydipsia, declining higher mental functions, memory cognition,     Never had kidney stones or fractures,   No family history of fracture   She had partial hysterectomy in 40's. Hot flashes since 51's      All of her sisters have or had thyroid issues. No family history of parathyroid, pituitary or adrenal tumors. No known family history of hypercalcemia or kidney stones.      Denies use of tums, loop diuretics,   lithium    Prediabetes and hypothyroidism as per pcp     The following portions of the patient's history were reviewed and updated as appropriate:     Family History   Problem Relation Age of Onset    Cancer Mother         cervical    Cancer Father         lung    Heart Failure Father         passed from heart attack    Heart Attack Father     Diabetes Sister     Drug Abuse Sister     Other Sister         drug overdose    Stroke Sister     Diabetes Sister     Diabetes Sister     Diabetes Paternal Uncle     Alzheimer's Disease Maternal Grandmother     Seizures Neg Hx     Thyroid Disease Neg Hx     High Cholesterol Neg Hx     Hypertension Neg Hx     High Blood Pressure Neg Hx            Social History     Socioeconomic History    Marital status:      Spouse name: Not on file    Number of children: Not on file    Years of education: Not on file    Highest education level: Not on file   Occupational History    Not on file   Tobacco Use    Smoking status: Former     Packs/day: 1.00     Years: 7.00     Pack years: 7.00     Types: Cigarettes     Quit date: 1985     Years since quittin.1    Smokeless tobacco: Never   Vaping Use    Vaping Use: Never used   Substance and Sexual Activity    Alcohol use: Yes     Comment: socially on occassion    Drug use: Not on file    Sexual activity: Yes     Partners: Male     Comment:    Other Topics Concern    Not on file   Social History Narrative    Not on file     Social Determinants of Health     Financial Resource Strain: Low Risk     Difficulty of Paying Living Expenses: Not hard at all   Food Insecurity: No Food Insecurity    Worried About Running Out of Food in the Last Year: Never true    Ran Out of Food in the Last Year: Never true   Transportation Needs: Not on file   Physical Activity: Unknown    Days of Exercise per Week: 0 days    Minutes of Exercise per Session: Not on file   Stress: Not on file   Social Connections: Not on file   Intimate Partner Violence: Not At Risk    Fear of Current or Ex-Partner: No    Emotionally Abused: No    Physically Abused: No    Sexually Abused: No   Housing Stability: Not on file           Past Medical History:   Diagnosis Date    High calcium levels     Hypertension     resolved after gastric bypass    Radiation thyroiditis     Thyroid disease          Past Surgical History:   Procedure Laterality Date    CHOLECYSTECTOMY      GASTRIC BYPASS SURGERY      5 yrs ago    HYSTERECTOMY (4 [facility])      2009 for heavy bleeding           Allergies   Allergen Reactions    Morphine Itching Current Outpatient Medications:     lisinopril (PRINIVIL;ZESTRIL) 20 MG tablet, Take 1 tablet by mouth daily, Disp: 90 tablet, Rfl: 1    calcium-vitamin D (OSCAL 500/200 D-3) 500-5 MG-MCG TABS per tablet, Take 1 tablet by mouth daily, Disp: 90 tablet, Rfl: 1    FLUoxetine (PROZAC) 20 MG tablet, Take 1 tablet by mouth daily, Disp: 90 tablet, Rfl: 3    hydroCHLOROthiazide (HYDRODIURIL) 25 MG tablet, TAKE ONE TABLET BY MOUTH DAILY, Disp: 90 tablet, Rfl: 3    levothyroxine (SYNTHROID) 50 MCG tablet, TAKE ONE TABLET BY MOUTH DAILY, Disp: 90 tablet, Rfl: 3    azelastine (ASTELIN) 0.1 % nasal spray, 1 spray by Nasal route 2 times daily Use in each nostril as directed, Disp: 60 mL, Rfl: 1    diclofenac sodium (VOLTAREN) 1 % GEL, APPLY FOUR GRAM TOPICALLY  FOUR TIMES A DAY AS NEEDED FOR PAIN, Disp: 100 g, Rfl: 0    triamcinolone (KENALOG) 0.1 % cream, Apply topically 2 times daily. , Disp: 80 g, Rfl: 3    fluocinonide (LIDEX) 0.05 % cream, Apply topically as needed , Disp: , Rfl:     omeprazole (PRILOSEC) 20 MG delayed release capsule, Take 20 mg by mouth as needed , Disp: , Rfl:     Review of Systems:  Constitutional: Negative for fever, chills, and unexpected weight change. HENT: Negative for congestion, ear pain, rhinorrhea,  sore throat and trouble swallowing. Eyes: Negative for photophobia, redness, itching. Respiratory: Negative for cough, shortness of breath and sputum. Cardiovascular: Negative for chest pain, palpitations and leg swelling. Gastrointestinal: Negative for nausea, vomiting, abdominal pain, diarrhea, constipation. Endocrine: Negative for cold intolerance, heat intolerance, polydipsia, polyphagia and polyuria. Genitourinary: Negative for dysuria, urgency, frequency, hematuria and flank pain. Musculoskeletal: she has pain all over including back pain and neck pain. Skin/Nail: Negative for rash, itching. Normal nails.    Neurological: Negative for seizures, weakness, light-headedness, numbness. Headaches present. Hematological/ Lymph nodes: Negative for adenopathy. Does not bruise/bleed easily. Psychiatric/Behavioral: Negative for suicidal ideas, depression, anxiety, sleep disturbance and decreased concentration. Physical Exam:  BP (!) 140/87 (Site: Left Upper Arm, Position: Sitting, Cuff Size: Large Adult)   Pulse 70   Ht 5' 3\" (1.6 m)   Wt 201 lb 9.6 oz (91.4 kg)   SpO2 97%   BMI 35.71 kg/m²      Constitutional: Patient is oriented to person, place, and time. Patient appears well-developed and well-nourished. HENT:    Head: Normocephalic and atraumatic. Eyes: Conjunctivae and EOM are normal.     Neck: Normal range of motion. Thyroid small. Cardiovascular: Normal rate, regular rhythm and normal heart sounds  Pulmonary/Chest: Effort normal and breath sounds normal.  Musculoskeletal: Normal range of motion. Neurological: Patient is alert and oriented to person, place, and time. Patient has normal reflexes. No hand tremors. Skin: Skin is warm and dry. Psychiatric: Patient has a normal mood and affect.  Patient behavior is normal.    Lab Review:    Orders Only on 01/23/2023   Component Date Value Ref Range Status    Occult Blood Fecal 01/23/2023 NEGATIVE   Final    Occult Blood Fecal 01/23/2023 Negative   Final   Orders Only on 01/23/2023   Component Date Value Ref Range Status    PTH 01/23/2023 157.7 (A)  14.0 - 72.0 pg/mL Final    Vit D, 25-Hydroxy 01/23/2023 34.4  >=30 ng/mL Final    Sodium 01/23/2023 143  136 - 145 mmol/L Final    Potassium 01/23/2023 4.2  3.5 - 5.1 mmol/L Final    Chloride 01/23/2023 106  99 - 110 mmol/L Final    CO2 01/23/2023 27  21 - 32 mmol/L Final    Anion Gap 01/23/2023 10  3 - 16 Final    Glucose 01/23/2023 96  70 - 99 mg/dL Final    BUN 01/23/2023 17  7 - 20 mg/dL Final    Creatinine 01/23/2023 0.8  0.6 - 1.1 mg/dL Final    Est, Glom Filt Rate 01/23/2023 >60  >60 Final    Calcium 01/23/2023 9.7  8.3 - 10.6 mg/dL Final Total Protein 01/23/2023 6.3 (A)  6.4 - 8.2 g/dL Final    Albumin 01/23/2023 4.0  3.4 - 5.0 g/dL Final    Albumin/Globulin Ratio 01/23/2023 1.7  1.1 - 2.2 Final    Total Bilirubin 01/23/2023 <0.2  0.0 - 1.0 mg/dL Final    Alkaline Phosphatase 01/23/2023 84  40 - 129 U/L Final    ALT 01/23/2023 14  10 - 40 U/L Final    AST 01/23/2023 16  15 - 37 U/L Final   Orders Only on 08/10/2022   Component Date Value Ref Range Status    PTH 08/10/2022 139.2 (A)  14.0 - 72.0 pg/mL Final    Vit D, 25-Hydroxy 08/10/2022 43.5  >=30 ng/mL Final    Sodium 08/10/2022 145  136 - 145 mmol/L Final    Potassium 08/10/2022 4.0  3.5 - 5.1 mmol/L Final    Chloride 08/10/2022 105  99 - 110 mmol/L Final    CO2 08/10/2022 28  21 - 32 mmol/L Final    Anion Gap 08/10/2022 12  3 - 16 Final    Glucose 08/10/2022 66 (A)  70 - 99 mg/dL Final    BUN 08/10/2022 21 (A)  7 - 20 mg/dL Final    Creatinine 08/10/2022 1.1  0.6 - 1.1 mg/dL Final    GFR Non- 08/10/2022 51 (A)  >60 Final    GFR  08/10/2022 >60  >60 Final    Calcium 08/10/2022 9.5  8.3 - 10.6 mg/dL Final    Total Protein 08/10/2022 6.5  6.4 - 8.2 g/dL Final    Albumin 08/10/2022 4.0  3.4 - 5.0 g/dL Final    Albumin/Globulin Ratio 08/10/2022 1.6  1.1 - 2.2 Final    Total Bilirubin 08/10/2022 <0.2  0.0 - 1.0 mg/dL Final    Alkaline Phosphatase 08/10/2022 95  40 - 129 U/L Final    ALT 08/10/2022 12  10 - 40 U/L Final    AST 08/10/2022 17  15 - 37 U/L Final   Orders Only on 05/24/2022   Component Date Value Ref Range Status    24hr Urine Volume (ml) 05/24/2022 2000  mL Corrected    Calcium, 24H Urine 05/24/2022 54  42 - 353 mg/24 hr Final    Creatinine, 24H Ur 05/24/2022 1.1  0.6 - 1.5 g/24hr Final    Magnesium 05/24/2022 2.30  1.80 - 2.40 mg/dL Final    Phosphorus 05/24/2022 3.3  2.5 - 4.9 mg/dL Final    PTH 05/24/2022 109.2 (A)  14.0 - 72.0 pg/mL Final    Vit D, 25-Hydroxy 05/24/2022 41.3  >=30 ng/mL Final    Sodium 05/24/2022 145  136 - 145 mmol/L Final    Potassium 05/24/2022 4.3  3.5 - 5.1 mmol/L Final    Chloride 05/24/2022 110  99 - 110 mmol/L Final    CO2 05/24/2022 24  21 - 32 mmol/L Final    Anion Gap 05/24/2022 11  3 - 16 Final    Glucose 05/24/2022 80  70 - 99 mg/dL Final    BUN 05/24/2022 14  7 - 20 mg/dL Final    Creatinine 05/24/2022 0.7  0.6 - 1.1 mg/dL Final    GFR Non- 05/24/2022 >60  >60 Final    GFR  05/24/2022 >60  >60 Final    Calcium 05/24/2022 9.3  8.3 - 10.6 mg/dL Final    Total Protein 05/24/2022 5.8 (A)  6.4 - 8.2 g/dL Final    Albumin 05/24/2022 3.6  3.4 - 5.0 g/dL Final    Albumin/Globulin Ratio 05/24/2022 1.6  1.1 - 2.2 Final    Total Bilirubin 05/24/2022 0.3  0.0 - 1.0 mg/dL Final    Alkaline Phosphatase 05/24/2022 78  40 - 129 U/L Final    ALT 05/24/2022 10  10 - 40 U/L Final    AST 05/24/2022 11 (A)  15 - 37 U/L Final   Orders Only on 03/16/2022   Component Date Value Ref Range Status    Calcium 03/16/2022 11.1 (A)  8.3 - 10.6 mg/dL Final    Vit D, 25-Hydroxy 03/16/2022 40.5  >=30 ng/mL Final    PTH 03/16/2022 103.8 (A)  14.0 - 72.0 pg/mL Final    ACTH 03/16/2022 8  6 - 58 pg/mL Final          Assessment and plan:     Isreal Connor was seen today for follow-up. Diagnoses and all orders for this visit:    Hypercalcemia  -     Comprehensive Metabolic Panel; Future  -     Vitamin D 25 Hydroxy; Future  -     Hemoglobin A1C; Future  -     PTH, Intact; Future    Hyperparathyroidism ([address]  -     Comprehensive Metabolic Panel; Future  -     Vitamin D 25 Hydroxy; Future  -     Hemoglobin A1C; Future  -     PTH, Intact; Future    H/O gastric bypass  -     Comprehensive Metabolic Panel; Future  -     Vitamin D 25 Hydroxy; Future  -     Hemoglobin A1C; Future  -     PTH, Intact; Future    Vitamin D deficiency  -     Comprehensive Metabolic Panel; Future  -     Vitamin D 25 Hydroxy; Future  -     Hemoglobin A1C; Future  -     PTH, Intact; Future    Fasting hyperglycemia  -     Comprehensive Metabolic Panel;  Future  -     Vitamin D 25 Hydroxy; Future  -     Hemoglobin A1C; Future  -     PTH, Intact; Future        1: Hypercalcemia   Hyperparathyroidism   H/o gastric bypass     Ma 2022: After stopping HCTZ for 2 weeks, Ca 9.3, , Vit D 41, 24 hour urine calcium 54-May 2022     Ca 9.7, .7, Vit D 34.4 - Jan 2023   Labs show secondary hyperparathyroidism likely due to gastric bypass     Keep HCTZ 25 mg daily for now   May cut down the dose if calcium goes way up     For secondary hyperparathyroidism, Change calcium 500 mg to one tab with lunch and two with dinner     2: Postmenopausal osteopenia   May 2022:  BMD normal at LS and hips, -1.5 at distal forearm   I calculated FRAX and it is low   Repeat DXA scan in 2-3 years     2: Vit D   34 - Jan 2023   Target for > 50   Restart Vit D gummies    4: Fasting hyperglycemia      Check fasting glucose and A1C       CMP, PTH, Vit D, A1C  before next visit       RTC in 6 months        Electronically signed by Yann Nieves MD on 2/9/2023 at 10:16 AM

## 2023-04-11 DIAGNOSIS — I10 ESSENTIAL HYPERTENSION, BENIGN: ICD-10-CM

## 2023-04-11 DIAGNOSIS — F32.A DEPRESSION, UNSPECIFIED DEPRESSION TYPE: ICD-10-CM

## 2023-04-11 DIAGNOSIS — F41.9 ANXIETY: ICD-10-CM

## 2023-04-11 DIAGNOSIS — E21.3 HYPERPARATHYROIDISM (HCC): ICD-10-CM

## 2023-04-12 RX ORDER — FLUOXETINE 20 MG/1
20 TABLET, FILM COATED ORAL DAILY
Qty: 90 TABLET | Refills: 3 | OUTPATIENT
Start: 2023-04-12

## 2023-04-12 RX ORDER — LISINOPRIL 20 MG/1
20 TABLET ORAL DAILY
Qty: 90 TABLET | Refills: 1 | OUTPATIENT
Start: 2023-04-12

## 2023-04-12 RX ORDER — HYDROCHLOROTHIAZIDE 25 MG/1
TABLET ORAL
Qty: 90 TABLET | Refills: 3 | OUTPATIENT
Start: 2023-04-12

## 2023-04-16 ENCOUNTER — PATIENT MESSAGE (OUTPATIENT)
Dept: ENDOCRINOLOGY | Age: 58
End: 2023-04-16

## 2023-04-16 DIAGNOSIS — M81.0 POST-MENOPAUSAL OSTEOPOROSIS: ICD-10-CM

## 2023-04-16 DIAGNOSIS — E55.9 VITAMIN D DEFICIENCY: Primary | ICD-10-CM

## 2023-04-17 RX ORDER — OYSTER SHELL CALCIUM WITH VITAMIN D 500; 200 MG/1; [IU]/1
1 TABLET, FILM COATED ORAL DAILY
Qty: 90 TABLET | Refills: 1 | Status: SHIPPED | OUTPATIENT
Start: 2023-04-17

## 2023-04-17 NOTE — TELEPHONE ENCOUNTER
From: Shireen Del  To: Dr. Mirna Adrian: 4/16/2023 11:00 PM EDT  Subject: Calcium prescription     I need a refill for the calcium with vit D

## 2023-04-17 NOTE — TELEPHONE ENCOUNTER
Medication:   Requested Prescriptions     Pending Prescriptions Disp Refills    calcium-vitamin D (OSCAL-500) 500-5 MG-MCG TABS per tablet 90 tablet 1     Sig: Take 1 tablet by mouth daily        Last Filled:      Patient Phone Number: [phone] (home)     Last appt: 2/9/2023   Next appt: 8/9/2023    Last OARRS: No flowsheet data found.

## 2023-04-18 ENCOUNTER — OFFICE VISIT (OUTPATIENT)
Dept: INTERNAL MEDICINE CLINIC | Age: 58
End: 2023-04-18

## 2023-04-18 VITALS
HEART RATE: 70 BPM | WEIGHT: 202.6 LBS | DIASTOLIC BLOOD PRESSURE: 78 MMHG | HEIGHT: 63 IN | OXYGEN SATURATION: 98 % | BODY MASS INDEX: 35.9 KG/M2 | SYSTOLIC BLOOD PRESSURE: 114 MMHG

## 2023-04-18 DIAGNOSIS — R00.2 PALPITATION: Primary | ICD-10-CM

## 2023-04-18 DIAGNOSIS — F41.9 ANXIETY: ICD-10-CM

## 2023-04-18 DIAGNOSIS — I10 ESSENTIAL HYPERTENSION, BENIGN: ICD-10-CM

## 2023-04-18 DIAGNOSIS — E89.0 POSTABLATIVE HYPOTHYROIDISM: ICD-10-CM

## 2023-04-18 DIAGNOSIS — E21.3 HYPERPARATHYROIDISM (HCC): ICD-10-CM

## 2023-04-18 PROBLEM — E83.52 HYPERCALCEMIA: Status: RESOLVED | Noted: 2022-05-10 | Resolved: 2023-04-18

## 2023-04-18 PROBLEM — M62.89 MUSCLE FATIGUE: Status: RESOLVED | Noted: 2020-03-01 | Resolved: 2023-04-18

## 2023-04-18 PROCEDURE — 3074F SYST BP LT 130 MM HG: CPT | Performed by: INTERNAL MEDICINE

## 2023-04-18 PROCEDURE — 3078F DIAST BP <80 MM HG: CPT | Performed by: INTERNAL MEDICINE

## 2023-04-18 PROCEDURE — 99214 OFFICE O/P EST MOD 30 MIN: CPT | Performed by: INTERNAL MEDICINE

## 2023-04-18 ASSESSMENT — ENCOUNTER SYMPTOMS
BACK PAIN: 0
ABDOMINAL PAIN: 0
SHORTNESS OF BREATH: 0
WHEEZING: 0
COUGH: 0
SORE THROAT: 0
CHEST TIGHTNESS: 0
VOMITING: 0
COLOR CHANGE: 0
CONSTIPATION: 0
NAUSEA: 0

## 2023-04-18 NOTE — ASSESSMENT & PLAN NOTE
Symptoms not suggestive of typical palpitation and can be altered vibratory sensation rather than cardiac etiology however patient does have high level of anxiety regarding underlying heart disease. Advised will check Holter monitor however exam today is consistent with regular sinus rhythm. Encouraged to update lab work that was ordered by endocrinology, other recent labs are not suggestive of abnormal thyroid function or anemia.   Advised if any new or worsening symptoms to call office onto results of Holter are available

## 2023-04-18 NOTE — ASSESSMENT & PLAN NOTE
Continue care and recommendation as per endocrinology, encouraged to complete lab work that was ordered for her

## 2023-04-18 NOTE — ASSESSMENT & PLAN NOTE
Blood pressure stable and well-controlled on current dose of hydrochlorothiazide and lisinopril, continue same along with maintaining low-salt diet, active lifestyle and attempting weight loss

## 2023-04-20 ENCOUNTER — HOSPITAL ENCOUNTER (OUTPATIENT)
Dept: ULTRASOUND IMAGING | Age: 58
Discharge: HOME | End: 2023-04-20
Payer: COMMERCIAL

## 2023-04-20 ENCOUNTER — HOSPITAL ENCOUNTER (OUTPATIENT)
Dept: WOMENS IMAGING | Age: 58
Discharge: HOME | End: 2023-04-20
Payer: COMMERCIAL

## 2023-04-20 DIAGNOSIS — R92.8 ABNORMAL MAMMOGRAM OF LEFT BREAST: ICD-10-CM

## 2023-04-20 PROCEDURE — 76642 ULTRASOUND BREAST LIMITED: CPT

## 2023-04-20 PROCEDURE — G0279 TOMOSYNTHESIS, MAMMO: HCPCS

## 2023-04-23 DIAGNOSIS — I10 ESSENTIAL HYPERTENSION, BENIGN: ICD-10-CM

## 2023-04-24 DIAGNOSIS — R92.8 ABNORMAL MAMMOGRAM OF LEFT BREAST: Primary | ICD-10-CM

## 2023-04-24 RX ORDER — HYDROCHLOROTHIAZIDE 25 MG/1
TABLET ORAL
Qty: 90 TABLET | Refills: 0 | Status: SHIPPED | OUTPATIENT
Start: 2023-04-24

## 2023-04-25 RX ORDER — CELECOXIB 200 MG/1
200 CAPSULE ORAL DAILY PRN
Qty: 30 CAPSULE | Refills: 0 | OUTPATIENT
Start: 2023-04-25

## 2023-05-07 DIAGNOSIS — I10 ESSENTIAL HYPERTENSION, BENIGN: ICD-10-CM

## 2023-05-07 DIAGNOSIS — F32.A DEPRESSION, UNSPECIFIED DEPRESSION TYPE: ICD-10-CM

## 2023-05-07 DIAGNOSIS — F41.9 ANXIETY: ICD-10-CM

## 2023-05-08 RX ORDER — LISINOPRIL 20 MG/1
20 TABLET ORAL DAILY
Qty: 90 TABLET | Refills: 1 | OUTPATIENT
Start: 2023-05-08

## 2023-05-08 RX ORDER — FLUOXETINE 20 MG/1
20 TABLET, FILM COATED ORAL DAILY
Qty: 90 TABLET | Refills: 0 | Status: SHIPPED | OUTPATIENT
Start: 2023-05-08

## 2023-05-08 NOTE — TELEPHONE ENCOUNTER
Last OV: 4/18/2023  Next OV: 5/7/2023    Next appointment due. She is due for an appt.  Pended for last refill until patient schedules

## 2023-05-13 DIAGNOSIS — I10 ESSENTIAL HYPERTENSION, BENIGN: ICD-10-CM

## 2023-05-15 RX ORDER — LISINOPRIL 20 MG/1
20 TABLET ORAL DAILY
Qty: 30 TABLET | Refills: 0 | Status: SHIPPED | OUTPATIENT
Start: 2023-05-15

## 2023-05-15 NOTE — TELEPHONE ENCOUNTER
Last OV: 4/18/2023  Next OV: Visit date not found    Next appointment due: 5/2023 nothing scheduled pending for 30 days only

## 2023-06-28 RX ORDER — CELECOXIB 200 MG/1
CAPSULE ORAL
Qty: 30 CAPSULE | Refills: 2 | OUTPATIENT
Start: 2023-06-28

## 2023-06-28 NOTE — TELEPHONE ENCOUNTER
Pt has not been seen since Sept 2022 and is sched to have eye surgery in July. She will need to sched appt to have this medication considered for refill.

## 2023-08-07 ENCOUNTER — PATIENT MESSAGE (OUTPATIENT)
Dept: INTERNAL MEDICINE CLINIC | Age: 58
End: 2023-08-07

## 2023-08-07 DIAGNOSIS — F41.9 ANXIETY: ICD-10-CM

## 2023-08-07 DIAGNOSIS — E03.9 ACQUIRED HYPOTHYROIDISM: ICD-10-CM

## 2023-08-07 DIAGNOSIS — M81.0 POST-MENOPAUSAL OSTEOPOROSIS: ICD-10-CM

## 2023-08-07 DIAGNOSIS — F32.A DEPRESSION, UNSPECIFIED DEPRESSION TYPE: ICD-10-CM

## 2023-08-07 DIAGNOSIS — E55.9 VITAMIN D DEFICIENCY: ICD-10-CM

## 2023-08-07 DIAGNOSIS — I10 ESSENTIAL HYPERTENSION, BENIGN: ICD-10-CM

## 2023-08-11 RX ORDER — LISINOPRIL 20 MG/1
20 TABLET ORAL DAILY
Qty: 90 TABLET | Refills: 3 | Status: SHIPPED | OUTPATIENT
Start: 2023-08-11

## 2023-08-11 RX ORDER — HYDROCHLOROTHIAZIDE 25 MG/1
TABLET ORAL
Qty: 90 TABLET | Refills: 3 | Status: SHIPPED | OUTPATIENT
Start: 2023-08-11

## 2023-08-11 RX ORDER — OYSTER SHELL CALCIUM WITH VITAMIN D 500; 200 MG/1; [IU]/1
1 TABLET, FILM COATED ORAL DAILY
Qty: 90 TABLET | Refills: 1 | Status: SHIPPED | OUTPATIENT
Start: 2023-08-11

## 2023-08-11 RX ORDER — LEVOTHYROXINE SODIUM 0.05 MG/1
TABLET ORAL
Qty: 90 TABLET | Refills: 3 | Status: SHIPPED | OUTPATIENT
Start: 2023-08-11

## 2023-08-11 RX ORDER — FLUOXETINE 20 MG/1
20 TABLET, FILM COATED ORAL DAILY
Qty: 90 TABLET | Refills: 3 | Status: SHIPPED | OUTPATIENT
Start: 2023-08-11

## 2023-11-06 ENCOUNTER — NURSE ONLY (OUTPATIENT)
Dept: CARDIOLOGY CLINIC | Age: 58
End: 2023-11-06

## 2023-11-06 NOTE — PROGRESS NOTES
50 Hr E-Patch requested for pt. Device was registered and placed. Tutorial given, pt verbalized understanding.  Date-11/06/23  Time-0230pm  jakob

## 2023-11-17 SDOH — ECONOMIC STABILITY: FOOD INSECURITY: WITHIN THE PAST 12 MONTHS, YOU WORRIED THAT YOUR FOOD WOULD RUN OUT BEFORE YOU GOT MONEY TO BUY MORE.: NEVER TRUE

## 2023-11-17 SDOH — ECONOMIC STABILITY: FOOD INSECURITY: WITHIN THE PAST 12 MONTHS, THE FOOD YOU BOUGHT JUST DIDN'T LAST AND YOU DIDN'T HAVE MONEY TO GET MORE.: NEVER TRUE

## 2023-11-17 SDOH — ECONOMIC STABILITY: HOUSING INSECURITY
IN THE LAST 12 MONTHS, WAS THERE A TIME WHEN YOU DID NOT HAVE A STEADY PLACE TO SLEEP OR SLEPT IN A SHELTER (INCLUDING NOW)?: NO

## 2023-11-17 SDOH — ECONOMIC STABILITY: TRANSPORTATION INSECURITY
IN THE PAST 12 MONTHS, HAS LACK OF TRANSPORTATION KEPT YOU FROM MEETINGS, WORK, OR FROM GETTING THINGS NEEDED FOR DAILY LIVING?: NO

## 2023-11-17 SDOH — ECONOMIC STABILITY: INCOME INSECURITY: HOW HARD IS IT FOR YOU TO PAY FOR THE VERY BASICS LIKE FOOD, HOUSING, MEDICAL CARE, AND HEATING?: SOMEWHAT HARD

## 2023-11-20 ENCOUNTER — OFFICE VISIT (OUTPATIENT)
Dept: INTERNAL MEDICINE CLINIC | Age: 58
End: 2023-11-20
Payer: COMMERCIAL

## 2023-11-20 VITALS
HEART RATE: 82 BPM | DIASTOLIC BLOOD PRESSURE: 82 MMHG | HEIGHT: 63 IN | OXYGEN SATURATION: 98 % | SYSTOLIC BLOOD PRESSURE: 116 MMHG | BODY MASS INDEX: 36.18 KG/M2

## 2023-11-20 DIAGNOSIS — G89.4 CHRONIC PAIN DISORDER: ICD-10-CM

## 2023-11-20 DIAGNOSIS — I10 ESSENTIAL HYPERTENSION, BENIGN: Primary | ICD-10-CM

## 2023-11-20 DIAGNOSIS — E89.0 POSTABLATIVE HYPOTHYROIDISM: ICD-10-CM

## 2023-11-20 DIAGNOSIS — F41.9 ANXIETY: ICD-10-CM

## 2023-11-20 DIAGNOSIS — R00.2 PALPITATION: ICD-10-CM

## 2023-11-20 PROCEDURE — G8427 DOCREV CUR MEDS BY ELIG CLIN: HCPCS | Performed by: NURSE PRACTITIONER

## 2023-11-20 PROCEDURE — G8484 FLU IMMUNIZE NO ADMIN: HCPCS | Performed by: NURSE PRACTITIONER

## 2023-11-20 PROCEDURE — 3079F DIAST BP 80-89 MM HG: CPT | Performed by: NURSE PRACTITIONER

## 2023-11-20 PROCEDURE — 3017F COLORECTAL CA SCREEN DOC REV: CPT | Performed by: NURSE PRACTITIONER

## 2023-11-20 PROCEDURE — G8417 CALC BMI ABV UP PARAM F/U: HCPCS | Performed by: NURSE PRACTITIONER

## 2023-11-20 PROCEDURE — 1036F TOBACCO NON-USER: CPT | Performed by: NURSE PRACTITIONER

## 2023-11-20 PROCEDURE — 3074F SYST BP LT 130 MM HG: CPT | Performed by: NURSE PRACTITIONER

## 2023-11-20 PROCEDURE — 99214 OFFICE O/P EST MOD 30 MIN: CPT | Performed by: NURSE PRACTITIONER

## 2023-11-20 RX ORDER — IPRATROPIUM BROMIDE 42 UG/1
SPRAY, METERED NASAL
COMMUNITY
Start: 2023-10-26

## 2023-11-20 NOTE — PROGRESS NOTES
11/20/23     No chief complaint on file. HPI    Here for follow up   Recently had holter monitor complete - it was ordered in April for palpitations but they resolved without intervention so she did not have it complete. They restarted about a month ago. Did not have palpitations when wearing monitoring. Happening about 1-2 times a week, lasts a few seconds and then resolves without intervention. They make her anxious. She is seeing [place] [facility] for chronic back pain, waiting on MRI approval       Allergies   Allergen Reactions    Morphine Itching       Current Outpatient Medications   Medication Sig Dispense Refill    ipratropium (ATROVENT) 0.06 % nasal spray       lisinopril (PRINIVIL;ZESTRIL) 20 MG tablet Take 1 tablet by mouth daily 90 tablet 3    FLUoxetine (PROZAC) 20 MG tablet Take 1 tablet by mouth daily 90 tablet 3    hydroCHLOROthiazide (HYDRODIURIL) 25 MG tablet TAKE ONE TABLET BY MOUTH DAILY 90 tablet 3    calcium-vitamin D (OSCAL-500) 500-5 MG-MCG TABS per tablet Take 1 tablet by mouth daily 90 tablet 1    levothyroxine (SYNTHROID) 50 MCG tablet TAKE ONE TABLET BY MOUTH DAILY 90 tablet 3    diclofenac sodium (VOLTAREN) 1 % GEL APPLY FOUR GRAM TOPICALLY  FOUR TIMES A DAY AS NEEDED FOR PAIN 100 g 0    triamcinolone (KENALOG) 0.1 % cream Apply topically 2 times daily. 80 g 3    omeprazole (PRILOSEC) 20 MG delayed release capsule Take 1 capsule by mouth as needed       No current facility-administered medications for this visit. Review of Systems  Negative other than HPI    Vitals:    11/20/23 1358   BP: 116/82   Pulse: 82   SpO2: 98%   Height: 1.6 m (5' 2.99\")      Physical Exam  Constitutional:       General: She is not in acute distress. Appearance: Normal appearance. She is not ill-appearing. HENT:      Head: Normocephalic and atraumatic. Cardiovascular:      Rate and Rhythm: Normal rate and regular rhythm. Heart sounds: Normal heart sounds.    Pulmonary:      Effort: Pulmonary

## 2023-11-20 NOTE — ASSESSMENT & PLAN NOTE
Intermittent. Recently had Holter monitor complete. Requesting results. RRR noted on exam today. Reviewed previous blood work in detail with patient.

## 2023-12-13 ENCOUNTER — HOSPITAL ENCOUNTER (OUTPATIENT)
Dept: MRI IMAGING | Age: 58
Discharge: HOME | End: 2023-12-13
Payer: COMMERCIAL

## 2023-12-13 DIAGNOSIS — M47.26 OTHER SPONDYLOSIS WITH RADICULOPATHY, LUMBAR REGION: ICD-10-CM

## 2023-12-13 PROCEDURE — 72148 MRI LUMBAR SPINE W/O DYE: CPT

## 2024-01-26 ASSESSMENT — PATIENT HEALTH QUESTIONNAIRE - PHQ9
8. MOVING OR SPEAKING SO SLOWLY THAT OTHER PEOPLE COULD HAVE NOTICED. OR THE OPPOSITE - BEING SO FIDGETY OR RESTLESS THAT YOU HAVE BEEN MOVING AROUND A LOT MORE THAN USUAL: NOT AT ALL
2. FEELING DOWN, DEPRESSED OR HOPELESS: 0
1. LITTLE INTEREST OR PLEASURE IN DOING THINGS: 1
9. THOUGHTS THAT YOU WOULD BE BETTER OFF DEAD, OR OF HURTING YOURSELF: 0
7. TROUBLE CONCENTRATING ON THINGS, SUCH AS READING THE NEWSPAPER OR WATCHING TELEVISION: 1
3. TROUBLE FALLING OR STAYING ASLEEP: MORE THAN HALF THE DAYS
6. FEELING BAD ABOUT YOURSELF - OR THAT YOU ARE A FAILURE OR HAVE LET YOURSELF OR YOUR FAMILY DOWN: NOT AT ALL
SUM OF ALL RESPONSES TO PHQ QUESTIONS 1-9: 5
4. FEELING TIRED OR HAVING LITTLE ENERGY: 1
4. FEELING TIRED OR HAVING LITTLE ENERGY: SEVERAL DAYS
SUM OF ALL RESPONSES TO PHQ9 QUESTIONS 1 & 2: 1
SUM OF ALL RESPONSES TO PHQ QUESTIONS 1-9: 5
6. FEELING BAD ABOUT YOURSELF - OR THAT YOU ARE A FAILURE OR HAVE LET YOURSELF OR YOUR FAMILY DOWN: 0
7. TROUBLE CONCENTRATING ON THINGS, SUCH AS READING THE NEWSPAPER OR WATCHING TELEVISION: SEVERAL DAYS
SUM OF ALL RESPONSES TO PHQ QUESTIONS 1-9: 5
9. THOUGHTS THAT YOU WOULD BE BETTER OFF DEAD, OR OF HURTING YOURSELF: NOT AT ALL
SUM OF ALL RESPONSES TO PHQ QUESTIONS 1-9: 5
1. LITTLE INTEREST OR PLEASURE IN DOING THINGS: SEVERAL DAYS
5. POOR APPETITE OR OVEREATING: 0
3. TROUBLE FALLING OR STAYING ASLEEP: 2
8. MOVING OR SPEAKING SO SLOWLY THAT OTHER PEOPLE COULD HAVE NOTICED. OR THE OPPOSITE, BEING SO FIGETY OR RESTLESS THAT YOU HAVE BEEN MOVING AROUND A LOT MORE THAN USUAL: 0
5. POOR APPETITE OR OVEREATING: NOT AT ALL
SUM OF ALL RESPONSES TO PHQ QUESTIONS 1-9: 5
10. IF YOU CHECKED OFF ANY PROBLEMS, HOW DIFFICULT HAVE THESE PROBLEMS MADE IT FOR YOU TO DO YOUR WORK, TAKE CARE OF THINGS AT HOME, OR GET ALONG WITH OTHER PEOPLE: 1
10. IF YOU CHECKED OFF ANY PROBLEMS, HOW DIFFICULT HAVE THESE PROBLEMS MADE IT FOR YOU TO DO YOUR WORK, TAKE CARE OF THINGS AT HOME, OR GET ALONG WITH OTHER PEOPLE: SOMEWHAT DIFFICULT
2. FEELING DOWN, DEPRESSED OR HOPELESS: NOT AT ALL

## 2024-01-29 ENCOUNTER — OFFICE VISIT (OUTPATIENT)
Dept: INTERNAL MEDICINE CLINIC | Age: 59
End: 2024-01-29
Payer: COMMERCIAL

## 2024-01-29 VITALS
WEIGHT: 204.2 LBS | BODY MASS INDEX: 36.18 KG/M2 | HEART RATE: 64 BPM | DIASTOLIC BLOOD PRESSURE: 72 MMHG | OXYGEN SATURATION: 96 % | SYSTOLIC BLOOD PRESSURE: 118 MMHG

## 2024-01-29 DIAGNOSIS — Z12.11 COLON CANCER SCREENING: ICD-10-CM

## 2024-01-29 DIAGNOSIS — R73.01 FASTING HYPERGLYCEMIA: ICD-10-CM

## 2024-01-29 DIAGNOSIS — R92.8 ABNORMALITY OF LEFT BREAST ON SCREENING MAMMOGRAM: ICD-10-CM

## 2024-01-29 DIAGNOSIS — E83.52 HYPERCALCEMIA: ICD-10-CM

## 2024-01-29 DIAGNOSIS — K21.9 GASTROESOPHAGEAL REFLUX DISEASE, UNSPECIFIED WHETHER ESOPHAGITIS PRESENT: ICD-10-CM

## 2024-01-29 DIAGNOSIS — M25.50 MULTIPLE JOINT PAIN: ICD-10-CM

## 2024-01-29 DIAGNOSIS — F41.9 ANXIETY: ICD-10-CM

## 2024-01-29 DIAGNOSIS — E21.3 HYPERPARATHYROIDISM (HCC): ICD-10-CM

## 2024-01-29 DIAGNOSIS — G89.4 CHRONIC PAIN DISORDER: ICD-10-CM

## 2024-01-29 DIAGNOSIS — E89.0 POSTABLATIVE HYPOTHYROIDISM: ICD-10-CM

## 2024-01-29 DIAGNOSIS — E55.9 VITAMIN D DEFICIENCY: ICD-10-CM

## 2024-01-29 DIAGNOSIS — I10 ESSENTIAL HYPERTENSION, BENIGN: Primary | ICD-10-CM

## 2024-01-29 PROCEDURE — 3078F DIAST BP <80 MM HG: CPT | Performed by: NURSE PRACTITIONER

## 2024-01-29 PROCEDURE — 3017F COLORECTAL CA SCREEN DOC REV: CPT | Performed by: NURSE PRACTITIONER

## 2024-01-29 PROCEDURE — 3074F SYST BP LT 130 MM HG: CPT | Performed by: NURSE PRACTITIONER

## 2024-01-29 PROCEDURE — G8417 CALC BMI ABV UP PARAM F/U: HCPCS | Performed by: NURSE PRACTITIONER

## 2024-01-29 PROCEDURE — 99214 OFFICE O/P EST MOD 30 MIN: CPT | Performed by: NURSE PRACTITIONER

## 2024-01-29 PROCEDURE — 1036F TOBACCO NON-USER: CPT | Performed by: NURSE PRACTITIONER

## 2024-01-29 PROCEDURE — G8484 FLU IMMUNIZE NO ADMIN: HCPCS | Performed by: NURSE PRACTITIONER

## 2024-01-29 PROCEDURE — G8427 DOCREV CUR MEDS BY ELIG CLIN: HCPCS | Performed by: NURSE PRACTITIONER

## 2024-01-29 RX ORDER — DULOXETIN HYDROCHLORIDE 30 MG/1
30 CAPSULE, DELAYED RELEASE ORAL DAILY
Qty: 30 CAPSULE | Refills: 1 | Status: SHIPPED | OUTPATIENT
Start: 2024-01-29

## 2024-01-29 RX ORDER — HYDROCHLOROTHIAZIDE 25 MG/1
TABLET ORAL
Qty: 90 TABLET | Refills: 1 | Status: SHIPPED | OUTPATIENT
Start: 2024-01-29

## 2024-01-29 RX ORDER — IPRATROPIUM BROMIDE 42 UG/1
2 SPRAY, METERED NASAL DAILY
Qty: 6 EACH | Refills: 1 | Status: SHIPPED | OUTPATIENT
Start: 2024-01-29

## 2024-01-29 RX ORDER — CELECOXIB 100 MG/1
100 CAPSULE ORAL DAILY PRN
Qty: 90 CAPSULE | Refills: 1 | Status: SHIPPED | OUTPATIENT
Start: 2024-01-29

## 2024-01-29 NOTE — ASSESSMENT & PLAN NOTE
Chronic, uncontrolled. Increase PPI to BID for 1 week for better control. Use celebrex sparingly due prevent worsening symptoms. Increase water intake. Avoid dietary triggers.

## 2024-01-29 NOTE — ASSESSMENT & PLAN NOTE
Chronic, uncontrolled.  Continue seeing [place]. Restart duloxetine for pain management. Restart celebrex - encouraged to use as needed. Dicussed medication use, risk and benefits in detail. Use as needed due to increased GERD symptoms. Increase daily PPI to BID for 7 days then return to daily dosing.

## 2024-01-29 NOTE — PROGRESS NOTES
1/29/24     Chief Complaint   Patient presents with    Medication Refill     Celebrex    Irregular Heart Beat     Pt wore a heart monitor 2 months ago. It was normal.     HPI    Here for follow up. Would like to restart celebrex for chronic pain. She was previously doing well on celebrex in the past.  Has been off it since her fibromyalgia study at . Has been off medication for the past 1.5 years. Did not see much difference with the study.  She was previously on duloxetine and thinks she did well on it.     Reports an intermittent burning sensation her chest. Comes and goes. Has happened a few times the past month, lasts a few seconds and resolves with deep breathing exercises. Happens at rest. Feels like she is breathing more shallow when it happens and is relieved with deep breathing. Has increased GERD at night with certain foods and eating late at night. Is taking PPI daily. Denies SOB, CP. She did have some palpitations and normal holter monitor a few months ago.     Allergies   Allergen Reactions    Morphine Itching       Current Outpatient Medications   Medication Sig Dispense Refill    hydroCHLOROthiazide (HYDRODIURIL) 25 MG tablet TAKE ONE TABLET BY MOUTH DAILY 90 tablet 1    ipratropium (ATROVENT) 0.06 % nasal spray 2 sprays by Nasal route daily 6 each 1    celecoxib (CELEBREX) 100 MG capsule Take 1 capsule by mouth daily as needed for Pain 90 capsule 1    DULoxetine (CYMBALTA) 30 MG extended release capsule Take 1 capsule by mouth daily 30 capsule 1    lisinopril (PRINIVIL;ZESTRIL) 20 MG tablet Take 1 tablet by mouth daily 90 tablet 3    calcium-vitamin D (OSCAL-500) 500-5 MG-MCG TABS per tablet Take 1 tablet by mouth daily 90 tablet 1    levothyroxine (SYNTHROID) 50 MCG tablet TAKE ONE TABLET BY MOUTH DAILY 90 tablet 3    diclofenac sodium (VOLTAREN) 1 % GEL APPLY FOUR GRAM TOPICALLY  FOUR TIMES A DAY AS NEEDED FOR PAIN 100 g 0    triamcinolone (KENALOG) 0.1 % cream Apply topically 2 times daily. 80 g

## 2024-01-29 NOTE — ASSESSMENT & PLAN NOTE
Chronic, uncontrolled. No longer on fluoxetine. Start duloxetine for dual benefit of anxiety and chronic pain.

## 2024-01-30 DIAGNOSIS — I10 ESSENTIAL HYPERTENSION, BENIGN: ICD-10-CM

## 2024-01-30 RX ORDER — LISINOPRIL 20 MG/1
20 TABLET ORAL DAILY
Qty: 90 TABLET | Refills: 3 | OUTPATIENT
Start: 2024-01-30

## 2024-02-23 ENCOUNTER — TELEPHONE (OUTPATIENT)
Dept: INTERNAL MEDICINE CLINIC | Age: 59
End: 2024-02-23

## 2024-02-23 DIAGNOSIS — R92.8 ABNORMALITY OF LEFT BREAST ON SCREENING MAMMOGRAM: Primary | ICD-10-CM

## 2024-02-23 NOTE — TELEPHONE ENCOUNTER
[facility] calling --pt missed her last 6 mo f/u so now needs just a Diagnostic Bilateral Mammogram order--and the US order is still good. Her appt is mid morning on Monday.  Thanks.

## 2024-02-26 ENCOUNTER — HOSPITAL ENCOUNTER (OUTPATIENT)
Dept: WOMENS IMAGING | Age: 59
Discharge: HOME | End: 2024-02-26
Payer: COMMERCIAL

## 2024-02-26 ENCOUNTER — PRE-PROCEDURE TELEPHONE (OUTPATIENT)
Dept: WOMENS IMAGING | Age: 59
End: 2024-02-26

## 2024-02-26 ENCOUNTER — HOSPITAL ENCOUNTER (OUTPATIENT)
Dept: ULTRASOUND IMAGING | Age: 59
Discharge: HOME | End: 2024-02-26
Payer: COMMERCIAL

## 2024-02-26 ENCOUNTER — TELEPHONE (OUTPATIENT)
Dept: WOMENS IMAGING | Age: 59
End: 2024-02-26

## 2024-02-26 VITALS — BODY MASS INDEX: 36.32 KG/M2 | WEIGHT: 205 LBS | HEIGHT: 63 IN

## 2024-02-26 DIAGNOSIS — R92.8 ABNORMAL MAMMOGRAM OF LEFT BREAST: ICD-10-CM

## 2024-02-26 DIAGNOSIS — R92.8 ABNORMAL MAMMOGRAM: Primary | ICD-10-CM

## 2024-02-26 PROCEDURE — 76642 ULTRASOUND BREAST LIMITED: CPT

## 2024-02-26 PROCEDURE — G0279 TOMOSYNTHESIS, MAMMO: HCPCS

## 2024-02-26 NOTE — TELEPHONE ENCOUNTER
Nurse Navigator reviewed pre-procedure ultrasound guided breast biopsy patient education information in person and gave written instructions.  Reviewed medications and need to hold all blood thinners per instructions.    Patient should take all other medications as prescribed.  Patient can eat and drink as normal prior to the procedure.  Be sure to wear a bra with good support and a two piece outfit for comfort.  Patient can bring someone with you but you can also drive yourself.  Plan on being at the breast center for 2-2 and a half hours.  Reviewed the process of a ultrasound biopsy.  The skin is cleaned and a local anesthetic is given to numb the area.  A small skin nick is made for the biopsy needle and then tissue samples are taken.  A tiny marker is then placed inside your breast at the site of the biopsy for future reference.    Pressure is then held on the biopsy site to stop bleeding and steri strips, bandage and waterproof dressing is applied.   A mammogram is then done to validate the tissue marker.  The tissue sample is sent to pathology. Results will come back in 2-3 business days and sent to your referring physician.  Either they or the nurse navigator will call you with the results and recommended follow up needed.  Patients states understanding.

## 2024-02-27 LAB — NONINV COLON CA DNA+OCC BLD SCRN STL QL: NEGATIVE

## 2024-03-13 ENCOUNTER — HOSPITAL ENCOUNTER (OUTPATIENT)
Dept: WOMENS IMAGING | Age: 59
Discharge: HOME | End: 2024-03-13
Payer: COMMERCIAL

## 2024-03-13 ENCOUNTER — HOSPITAL ENCOUNTER (OUTPATIENT)
Dept: ULTRASOUND IMAGING | Age: 59
Discharge: HOME | End: 2024-03-13
Payer: COMMERCIAL

## 2024-03-13 DIAGNOSIS — Z98.890 STATUS POST LEFT BREAST BIOPSY: ICD-10-CM

## 2024-03-13 DIAGNOSIS — R92.8 ABNORMAL MAMMOGRAM: ICD-10-CM

## 2024-03-13 PROCEDURE — 77065 DX MAMMO INCL CAD UNI: CPT

## 2024-03-13 PROCEDURE — 2500000003 HC RX 250 WO HCPCS: Performed by: NURSE PRACTITIONER

## 2024-03-13 PROCEDURE — 88342 IMHCHEM/IMCYTCHM 1ST ANTB: CPT

## 2024-03-13 PROCEDURE — 88305 TISSUE EXAM BY PATHOLOGIST: CPT

## 2024-03-13 PROCEDURE — 19083 BX BREAST 1ST LESION US IMAG: CPT

## 2024-03-13 RX ORDER — LIDOCAINE HYDROCHLORIDE 10 MG/ML
5 INJECTION, SOLUTION EPIDURAL; INFILTRATION; INTRACAUDAL; PERINEURAL ONCE
Status: COMPLETED | OUTPATIENT
Start: 2024-03-13 | End: 2024-03-13

## 2024-03-13 RX ORDER — LIDOCAINE HYDROCHLORIDE AND EPINEPHRINE 10; 10 MG/ML; UG/ML
20 INJECTION, SOLUTION INFILTRATION; PERINEURAL ONCE
Status: COMPLETED | OUTPATIENT
Start: 2024-03-13 | End: 2024-03-13

## 2024-03-13 RX ADMIN — LIDOCAINE HYDROCHLORIDE ANHYDROUS 5 ML: 10 INJECTION, SOLUTION INFILTRATION at 14:42

## 2024-03-13 RX ADMIN — LIDOCAINE HYDROCHLORIDE,EPINEPHRINE BITARTRATE 10 ML: 10; .01 INJECTION, SOLUTION INFILTRATION; PERINEURAL at 14:42

## 2024-03-13 ASSESSMENT — PAIN SCALES - GENERAL: PAINLEVEL_OUTOF10: 3

## 2024-03-13 ASSESSMENT — PAIN DESCRIPTION - LOCATION: LOCATION: BREAST

## 2024-03-13 ASSESSMENT — PAIN DESCRIPTION - DESCRIPTORS: DESCRIPTORS: DISCOMFORT

## 2024-03-13 ASSESSMENT — PAIN - FUNCTIONAL ASSESSMENT: PAIN_FUNCTIONAL_ASSESSMENT: ACTIVITIES ARE NOT PREVENTED

## 2024-03-13 ASSESSMENT — PAIN DESCRIPTION - ORIENTATION: ORIENTATION: LEFT

## 2024-03-13 NOTE — PROGRESS NOTES
Patient here for left breast biopsy. NN reviewed the health history, allergies and medications. Patient drove herself.   Radiologist reviews procedure with patient, consent signed. Patient tolerates procedure well. Compression held. Site cleansed with chloraprep, steri strips and dry dressing applied. Ice pack in place. Reviewed discharge instructions with patient and signed copy. Patient verbalized understanding and agreed to contact Nurse Navigator with any questions. Patient A&Ox3, steady on feet and discharged home.

## 2024-03-18 ENCOUNTER — TELEPHONE (OUTPATIENT)
Dept: WOMENS IMAGING | Age: 59
End: 2024-03-18

## 2024-03-18 NOTE — TELEPHONE ENCOUNTER
Nurse Navigator reviewed results of breast biopsy which showed Benign breast tissue with sclerosing adenosis and usual ductal hyperplasia on the pathology report.      Negative for atypia or malignancy. NN reviewed radiologist follow up recommendations as 6 month ultrasound.    Pt verbalized understanding, all questions answered at this time.

## 2024-03-26 DIAGNOSIS — M25.50 MULTIPLE JOINT PAIN: ICD-10-CM

## 2024-03-26 RX ORDER — DULOXETIN HYDROCHLORIDE 30 MG/1
30 CAPSULE, DELAYED RELEASE ORAL DAILY
Qty: 30 CAPSULE | Refills: 1 | Status: SHIPPED | OUTPATIENT
Start: 2024-03-26

## 2024-03-26 NOTE — TELEPHONE ENCOUNTER
Last OV: 1/29/2024  Next OV: 5/20/2024    Next appointment due:(around 5/20/2024)     Last fill:1/29/24  Refills:1

## 2024-03-29 DIAGNOSIS — E89.0 POSTABLATIVE HYPOTHYROIDISM: ICD-10-CM

## 2024-03-29 DIAGNOSIS — E55.9 VITAMIN D DEFICIENCY: ICD-10-CM

## 2024-03-29 DIAGNOSIS — I10 ESSENTIAL HYPERTENSION, BENIGN: ICD-10-CM

## 2024-03-29 DIAGNOSIS — R73.01 FASTING HYPERGLYCEMIA: ICD-10-CM

## 2024-03-29 LAB
25(OH)D3 SERPL-MCNC: 58.6 NG/ML
ALBUMIN SERPL-MCNC: 4.2 G/DL (ref 3.4–5)
ALBUMIN/GLOB SERPL: 1.8 {RATIO} (ref 1.1–2.2)
ALP SERPL-CCNC: 101 U/L (ref 40–129)
ALT SERPL-CCNC: 18 U/L (ref 10–40)
ANION GAP SERPL CALCULATED.3IONS-SCNC: 9 MMOL/L (ref 3–16)
AST SERPL-CCNC: 18 U/L (ref 15–37)
BILIRUB SERPL-MCNC: 0.4 MG/DL (ref 0–1)
BUN SERPL-MCNC: 24 MG/DL (ref 7–20)
CALCIUM SERPL-MCNC: 10.7 MG/DL (ref 8.3–10.6)
CHLORIDE SERPL-SCNC: 104 MMOL/L (ref 99–110)
CHOLEST SERPL-MCNC: 143 MG/DL (ref 0–199)
CO2 SERPL-SCNC: 28 MMOL/L (ref 21–32)
CREAT SERPL-MCNC: 0.8 MG/DL (ref 0.6–1.1)
GFR SERPLBLD CREATININE-BSD FMLA CKD-EPI: 84 ML/MIN/{1.73_M2}
GLUCOSE SERPL-MCNC: 91 MG/DL (ref 70–99)
HDLC SERPL-MCNC: 50 MG/DL (ref 40–60)
LDLC SERPL CALC-MCNC: 63 MG/DL
POTASSIUM SERPL-SCNC: 4 MMOL/L (ref 3.5–5.1)
PROT SERPL-MCNC: 6.5 G/DL (ref 6.4–8.2)
SODIUM SERPL-SCNC: 141 MMOL/L (ref 136–145)
TRIGL SERPL-MCNC: 148 MG/DL (ref 0–150)
TSH SERPL DL<=0.005 MIU/L-ACNC: 1.86 UIU/ML (ref 0.27–4.2)
VLDLC SERPL CALC-MCNC: 30 MG/DL

## 2024-03-30 LAB
EST. AVERAGE GLUCOSE BLD GHB EST-MCNC: 114 MG/DL
HBA1C MFR BLD: 5.6 %

## 2024-04-05 ENCOUNTER — OFFICE VISIT (OUTPATIENT)
Dept: ENDOCRINOLOGY | Age: 59
End: 2024-04-05
Payer: COMMERCIAL

## 2024-04-05 VITALS
BODY MASS INDEX: 35.61 KG/M2 | WEIGHT: 201 LBS | HEIGHT: 63 IN | DIASTOLIC BLOOD PRESSURE: 83 MMHG | SYSTOLIC BLOOD PRESSURE: 159 MMHG | RESPIRATION RATE: 16 BRPM | HEART RATE: 64 BPM

## 2024-04-05 DIAGNOSIS — E55.9 VITAMIN D DEFICIENCY: ICD-10-CM

## 2024-04-05 DIAGNOSIS — M85.89 OSTEOPENIA OF MULTIPLE SITES: ICD-10-CM

## 2024-04-05 DIAGNOSIS — Z98.84 H/O GASTRIC BYPASS: ICD-10-CM

## 2024-04-05 DIAGNOSIS — E83.52 HYPERCALCEMIA: Primary | ICD-10-CM

## 2024-04-05 DIAGNOSIS — E21.3 HYPERPARATHYROIDISM (HCC): ICD-10-CM

## 2024-04-05 PROCEDURE — 99214 OFFICE O/P EST MOD 30 MIN: CPT | Performed by: INTERNAL MEDICINE

## 2024-04-05 PROCEDURE — 3077F SYST BP >= 140 MM HG: CPT | Performed by: INTERNAL MEDICINE

## 2024-04-05 PROCEDURE — G8427 DOCREV CUR MEDS BY ELIG CLIN: HCPCS | Performed by: INTERNAL MEDICINE

## 2024-04-05 PROCEDURE — G8417 CALC BMI ABV UP PARAM F/U: HCPCS | Performed by: INTERNAL MEDICINE

## 2024-04-05 PROCEDURE — 1036F TOBACCO NON-USER: CPT | Performed by: INTERNAL MEDICINE

## 2024-04-05 PROCEDURE — 3079F DIAST BP 80-89 MM HG: CPT | Performed by: INTERNAL MEDICINE

## 2024-04-05 PROCEDURE — 3017F COLORECTAL CA SCREEN DOC REV: CPT | Performed by: INTERNAL MEDICINE

## 2024-04-05 NOTE — PROGRESS NOTES
Triglycerides 03/29/2024 148  0 - 150 mg/dL Final    HDL 03/29/2024 50  40 - 60 mg/dL Final    LDL Calculated 03/29/2024 63  <100 mg/dL Final    VLDL Cholesterol Calculated 03/29/2024 30  Not Established mg/dL Final    Sodium 03/29/2024 141  136 - 145 mmol/L Final    Potassium 03/29/2024 4.0  3.5 - 5.1 mmol/L Final    Chloride 03/29/2024 104  99 - 110 mmol/L Final    CO2 03/29/2024 28  21 - 32 mmol/L Final    Anion Gap 03/29/2024 9  3 - 16 Final    Glucose 03/29/2024 91  70 - 99 mg/dL Final    BUN 03/29/2024 24 (H)  7 - 20 mg/dL Final    Creatinine 03/29/2024 0.8  0.6 - 1.1 mg/dL Final    Est, Glom Filt Rate 03/29/2024 84  >60 Final    Calcium 03/29/2024 10.7 (H)  8.3 - 10.6 mg/dL Final    Total Protein 03/29/2024 6.5  6.4 - 8.2 g/dL Final    Albumin 03/29/2024 4.2  3.4 - 5.0 g/dL Final    Albumin/Globulin Ratio 03/29/2024 1.8  1.1 - 2.2 Final    Total Bilirubin 03/29/2024 0.4  0.0 - 1.0 mg/dL Final    Alkaline Phosphatase 03/29/2024 101  40 - 129 U/L Final    ALT 03/29/2024 18  10 - 40 U/L Final    AST 03/29/2024 18  15 - 37 U/L Final   Office Visit on 01/29/2024   Component Date Value Ref Range Status    FIT-DNA (Cologuard) 02/15/2024 Negative  Negative Final   Orders Only on 04/18/2023   Component Date Value Ref Range Status    Sodium 04/18/2023 141  136 - 145 mmol/L Final    Potassium 04/18/2023 4.0  3.5 - 5.1 mmol/L Final    Chloride 04/18/2023 103  99 - 110 mmol/L Final    CO2 04/18/2023 29  21 - 32 mmol/L Final    Anion Gap 04/18/2023 9  3 - 16 Final    Glucose 04/18/2023 122 (H)  70 - 99 mg/dL Final    BUN 04/18/2023 20  7 - 20 mg/dL Final    Creatinine 04/18/2023 0.9  0.6 - 1.1 mg/dL Final    Est, Glom Filt Rate 04/18/2023 >60  >60 Final    Calcium 04/18/2023 11.2 (H)  8.3 - 10.6 mg/dL Final    Total Protein 04/18/2023 6.8  6.4 - 8.2 g/dL Final    Albumin 04/18/2023 4.3  3.4 - 5.0 g/dL Final    Albumin/Globulin Ratio 04/18/2023 1.7  1.1 - 2.2 Final    Total Bilirubin 04/18/2023 <0.2  0.0 - 1.0 mg/dL

## 2024-04-23 ENCOUNTER — PATIENT MESSAGE (OUTPATIENT)
Dept: ENDOCRINOLOGY | Age: 59
End: 2024-04-23

## 2024-04-24 NOTE — TELEPHONE ENCOUNTER
From: Nicol Rebolledo  To: Dr. Mitul Vasquez  Sent: 4/23/2024 4:28 PM EDT  Subject: Extension on lab work     I forgot about lab work can you extend it until the end of may! Please

## 2024-05-22 ENCOUNTER — OFFICE VISIT (OUTPATIENT)
Dept: ENDOCRINOLOGY | Age: 59
End: 2024-05-22
Payer: COMMERCIAL

## 2024-05-22 VITALS
OXYGEN SATURATION: 98 % | HEIGHT: 63 IN | SYSTOLIC BLOOD PRESSURE: 112 MMHG | DIASTOLIC BLOOD PRESSURE: 78 MMHG | BODY MASS INDEX: 35.44 KG/M2 | WEIGHT: 200 LBS | HEART RATE: 82 BPM

## 2024-05-22 DIAGNOSIS — E55.9 VITAMIN D DEFICIENCY: Primary | ICD-10-CM

## 2024-05-22 DIAGNOSIS — E83.52 HYPERCALCEMIA: ICD-10-CM

## 2024-05-22 DIAGNOSIS — M85.89 OSTEOPENIA OF MULTIPLE SITES: ICD-10-CM

## 2024-05-22 DIAGNOSIS — E21.3 HYPERPARATHYROIDISM (HCC): ICD-10-CM

## 2024-05-22 DIAGNOSIS — Z98.84 H/O GASTRIC BYPASS: ICD-10-CM

## 2024-05-22 DIAGNOSIS — R73.01 FASTING HYPERGLYCEMIA: ICD-10-CM

## 2024-05-22 PROCEDURE — G8427 DOCREV CUR MEDS BY ELIG CLIN: HCPCS | Performed by: INTERNAL MEDICINE

## 2024-05-22 PROCEDURE — 3078F DIAST BP <80 MM HG: CPT | Performed by: INTERNAL MEDICINE

## 2024-05-22 PROCEDURE — 3017F COLORECTAL CA SCREEN DOC REV: CPT | Performed by: INTERNAL MEDICINE

## 2024-05-22 PROCEDURE — 99214 OFFICE O/P EST MOD 30 MIN: CPT | Performed by: INTERNAL MEDICINE

## 2024-05-22 PROCEDURE — 3074F SYST BP LT 130 MM HG: CPT | Performed by: INTERNAL MEDICINE

## 2024-05-22 PROCEDURE — 1036F TOBACCO NON-USER: CPT | Performed by: INTERNAL MEDICINE

## 2024-05-22 PROCEDURE — G8417 CALC BMI ABV UP PARAM F/U: HCPCS | Performed by: INTERNAL MEDICINE

## 2024-05-22 NOTE — PROGRESS NOTES
Cardiovascular: Negative for chest pain, palpitations and leg swelling.   Gastrointestinal: Negative for nausea, vomiting, abdominal pain, diarrhea, constipation.   Endocrine: Negative for cold intolerance, heat intolerance, polydipsia, polyphagia and polyuria.   Genitourinary: Negative for dysuria, urgency, frequency, hematuria and flank pain.   Musculoskeletal: she has pain all over including back pain and neck pain.   Skin/Nail: Negative for rash, itching. Normal nails.   Neurological: Negative for seizures, weakness, light-headedness, numbness. Headaches present.   Hematological/ Lymph nodes: Negative for adenopathy. Does not bruise/bleed easily.   Psychiatric/Behavioral: Negative for suicidal ideas, depression, anxiety, sleep disturbance and decreased concentration.        Physical Exam:  There were no vitals taken for this visit.     Constitutional: Patient is oriented to person, place, and time. Patient appears well-developed and well-nourished.   HENT:    Head: Normocephalic and atraumatic.     Eyes: Conjunctivae and EOM are normal.     Neck: Normal range of motion. Thyroid small.   Cardiovascular: Normal rate, regular rhythm and normal heart sounds  Pulmonary/Chest: Effort normal and breath sounds normal.  Musculoskeletal: Normal range of motion.  Neurological: Patient is alert and oriented to person, place, and time. Patient has normal reflexes. No hand tremors.   Skin: Skin is warm and dry.  Psychiatric: Patient has a normal mood and affect. Patient behavior is normal.    Lab Review:    Orders Only on 03/29/2024   Component Date Value Ref Range Status    Vit D, 25-Hydroxy 03/29/2024 58.6  >=30 ng/mL Final    TSH Reflex FT4 03/29/2024 1.86  0.27 - 4.20 uIU/mL Final    Hemoglobin A1C 03/29/2024 5.6  See comment % Final    Estimated Avg Glucose 03/29/2024 114.0  mg/dL Final    Cholesterol, Total 03/29/2024 143  0 - 199 mg/dL Final    Triglycerides 03/29/2024 148  0 - 150 mg/dL Final    HDL 03/29/2024 50

## 2024-05-25 DIAGNOSIS — M25.50 MULTIPLE JOINT PAIN: ICD-10-CM

## 2024-05-28 RX ORDER — DULOXETIN HYDROCHLORIDE 30 MG/1
30 CAPSULE, DELAYED RELEASE ORAL DAILY
Qty: 90 CAPSULE | Refills: 1 | Status: SHIPPED | OUTPATIENT
Start: 2024-05-28

## 2024-05-28 NOTE — TELEPHONE ENCOUNTER
Last OV: 1/29/2024  Next OV: Visit date not found    Next appointment due:not stated     Last fill:3/26/24  Refills:1

## 2024-05-31 RX ORDER — IPRATROPIUM BROMIDE 42 UG/1
SPRAY, METERED NASAL
Qty: 15 ML | Refills: 0 | Status: SHIPPED | OUTPATIENT
Start: 2024-05-31

## 2024-06-03 DIAGNOSIS — E83.52 HYPERCALCEMIA: ICD-10-CM

## 2024-06-03 DIAGNOSIS — M85.89 OSTEOPENIA OF MULTIPLE SITES: ICD-10-CM

## 2024-06-03 DIAGNOSIS — Z98.84 H/O GASTRIC BYPASS: ICD-10-CM

## 2024-06-03 DIAGNOSIS — E21.3 HYPERPARATHYROIDISM (HCC): ICD-10-CM

## 2024-06-03 DIAGNOSIS — R73.01 FASTING HYPERGLYCEMIA: ICD-10-CM

## 2024-06-03 DIAGNOSIS — E55.9 VITAMIN D DEFICIENCY: ICD-10-CM

## 2024-06-04 LAB
ALBUMIN SERPL-MCNC: 4.3 G/DL (ref 3.4–5)
ALBUMIN/GLOB SERPL: 1.7 {RATIO} (ref 1.1–2.2)
ALP SERPL-CCNC: 100 U/L (ref 40–129)
ALT SERPL-CCNC: 17 U/L (ref 10–40)
ANION GAP SERPL CALCULATED.3IONS-SCNC: 12 MMOL/L (ref 3–16)
AST SERPL-CCNC: 19 U/L (ref 15–37)
BILIRUB SERPL-MCNC: 0.3 MG/DL (ref 0–1)
BUN SERPL-MCNC: 19 MG/DL (ref 7–20)
CALCIUM SERPL-MCNC: 11.6 MG/DL (ref 8.3–10.6)
CHLORIDE SERPL-SCNC: 99 MMOL/L (ref 99–110)
CO2 SERPL-SCNC: 26 MMOL/L (ref 21–32)
CREAT SERPL-MCNC: 0.9 MG/DL (ref 0.6–1.2)
EST. AVERAGE GLUCOSE BLD GHB EST-MCNC: 114 MG/DL
GFR SERPLBLD CREATININE-BSD FMLA CKD-EPI: 73 ML/MIN/{1.73_M2}
GLUCOSE SERPL-MCNC: 93 MG/DL (ref 70–99)
HBA1C MFR BLD: 5.6 %
MAGNESIUM SERPL-MCNC: 2.3 MG/DL (ref 1.8–2.4)
POTASSIUM SERPL-SCNC: 4.7 MMOL/L (ref 3.5–5.1)
PROT SERPL-MCNC: 6.9 G/DL (ref 6.4–8.2)
PTH-INTACT SERPL-MCNC: 148.9 PG/ML (ref 14–72)
SODIUM SERPL-SCNC: 137 MMOL/L (ref 136–145)

## 2024-06-11 ENCOUNTER — TRANSCRIBE ORDERS (OUTPATIENT)
Dept: ADMINISTRATIVE | Age: 59
End: 2024-06-11

## 2024-06-11 DIAGNOSIS — M54.12 CERVICAL RADICULOPATHY: Primary | ICD-10-CM

## 2024-06-17 DIAGNOSIS — I10 ESSENTIAL HYPERTENSION, BENIGN: ICD-10-CM

## 2024-06-17 DIAGNOSIS — E03.9 ACQUIRED HYPOTHYROIDISM: ICD-10-CM

## 2024-06-18 RX ORDER — LEVOTHYROXINE SODIUM 0.05 MG/1
TABLET ORAL
Qty: 90 TABLET | Refills: 0 | Status: SHIPPED | OUTPATIENT
Start: 2024-06-18

## 2024-06-18 RX ORDER — LISINOPRIL 20 MG/1
20 TABLET ORAL DAILY
Qty: 90 TABLET | Refills: 0 | Status: SHIPPED | OUTPATIENT
Start: 2024-06-18

## 2024-06-18 RX ORDER — HYDROCHLOROTHIAZIDE 25 MG/1
TABLET ORAL
Qty: 90 TABLET | Refills: 1 | Status: SHIPPED | OUTPATIENT
Start: 2024-06-18

## 2024-06-18 NOTE — TELEPHONE ENCOUNTER
Last OV: 1/29/2024  Next OV: 6/17/2024    Next appointment due:not stated     Last fill:1/29/24  Refills:1

## 2024-06-18 NOTE — TELEPHONE ENCOUNTER
Last OV: 1/29/2024  Next OV: Visit date not found    Next appointment due:  Return in about 6 months around 5/20/2024    Last fill:8/11/23  Refills:3#90

## 2024-06-20 ENCOUNTER — OFFICE VISIT (OUTPATIENT)
Dept: INTERNAL MEDICINE CLINIC | Age: 59
End: 2024-06-20
Payer: COMMERCIAL

## 2024-06-20 VITALS
OXYGEN SATURATION: 98 % | DIASTOLIC BLOOD PRESSURE: 74 MMHG | SYSTOLIC BLOOD PRESSURE: 130 MMHG | WEIGHT: 199.8 LBS | HEIGHT: 63 IN | HEART RATE: 70 BPM | BODY MASS INDEX: 35.4 KG/M2

## 2024-06-20 DIAGNOSIS — E21.3 HYPERPARATHYROIDISM (HCC): ICD-10-CM

## 2024-06-20 DIAGNOSIS — Z82.49 FAMILY HISTORY OF CORONARY ARTERY DISEASE: ICD-10-CM

## 2024-06-20 DIAGNOSIS — R73.01 FASTING HYPERGLYCEMIA: ICD-10-CM

## 2024-06-20 DIAGNOSIS — I10 ESSENTIAL HYPERTENSION, BENIGN: ICD-10-CM

## 2024-06-20 DIAGNOSIS — N83.201 CYST OF RIGHT OVARY: ICD-10-CM

## 2024-06-20 DIAGNOSIS — E89.0 POSTABLATIVE HYPOTHYROIDISM: ICD-10-CM

## 2024-06-20 DIAGNOSIS — K21.9 GASTROESOPHAGEAL REFLUX DISEASE, UNSPECIFIED WHETHER ESOPHAGITIS PRESENT: ICD-10-CM

## 2024-06-20 DIAGNOSIS — G89.4 CHRONIC PAIN DISORDER: ICD-10-CM

## 2024-06-20 DIAGNOSIS — Z01.818 PREOP EXAM FOR INTERNAL MEDICINE: Primary | ICD-10-CM

## 2024-06-20 DIAGNOSIS — F41.9 ANXIETY: ICD-10-CM

## 2024-06-20 PROCEDURE — 3075F SYST BP GE 130 - 139MM HG: CPT | Performed by: NURSE PRACTITIONER

## 2024-06-20 PROCEDURE — 93000 ELECTROCARDIOGRAM COMPLETE: CPT | Performed by: NURSE PRACTITIONER

## 2024-06-20 PROCEDURE — 3017F COLORECTAL CA SCREEN DOC REV: CPT | Performed by: NURSE PRACTITIONER

## 2024-06-20 PROCEDURE — G2211 COMPLEX E/M VISIT ADD ON: HCPCS | Performed by: NURSE PRACTITIONER

## 2024-06-20 PROCEDURE — 99214 OFFICE O/P EST MOD 30 MIN: CPT | Performed by: NURSE PRACTITIONER

## 2024-06-20 PROCEDURE — G8417 CALC BMI ABV UP PARAM F/U: HCPCS | Performed by: NURSE PRACTITIONER

## 2024-06-20 PROCEDURE — 3078F DIAST BP <80 MM HG: CPT | Performed by: NURSE PRACTITIONER

## 2024-06-20 PROCEDURE — G8427 DOCREV CUR MEDS BY ELIG CLIN: HCPCS | Performed by: NURSE PRACTITIONER

## 2024-06-20 PROCEDURE — 1036F TOBACCO NON-USER: CPT | Performed by: NURSE PRACTITIONER

## 2024-06-20 ASSESSMENT — ENCOUNTER SYMPTOMS
CHEST TIGHTNESS: 0
COUGH: 0
SHORTNESS OF BREATH: 0
WHEEZING: 0

## 2024-06-20 NOTE — ASSESSMENT & PLAN NOTE
Chronic, controlled. Continue duloxetine 30 mg daily for dual benefit of anxiety and chronic pain. Using meloxicam prn - will hold for 7 days prior to surgery.

## 2024-06-20 NOTE — ASSESSMENT & PLAN NOTE
Family history of CAD in father who had multiple MI and passed away at age 63. Sister had a stroke. She has good lipid control with diet following bariatric surgery. She is not on a statin due to chronic pain/ myalgias.

## 2024-06-20 NOTE — PROGRESS NOTES
2024    This is a 59 y.o. female   Chief Complaint   Patient presents with    Pre-op Exam     Removal of ovaries at [facility] on  (date unknown) with Dr. Whitlock       HPI    Nicol Rebolledo is a 59 y.o.  female who comes for a preoperative exam.  She  is referred by Dr. Savage for perioperative risk determination for upcoming surgery for bilateral salpingo-oophorectomy.  Denies taking any asa, blood thinners, fish oil, NSAIDs. She takes celebrex prn, not frequently and will avoid for 7 days prior to surgery. Denies any previous complications with anesthesia.  Pt is requesting a stress test prior to surgery - she has a family history of MI - father, passed away at 63. Smoker, quit at age 52. She denies any CP, SOB or dizziness.       Past Medical History:   Diagnosis Date    High calcium levels     Hypertension     resolved after gastric bypass    Radiation thyroiditis     Thyroid disease        Past Surgical History:   Procedure Laterality Date    CHOLECYSTECTOMY      GASTRIC BYPASS SURGERY      5 yrs ago    HYSTERECTOMY (CERVIX STATUS UNKNOWN)       for heavy bleeding    US BREAST BIOPSY W LOC DEVICE 1ST LESION LEFT Left 3/13/2024    US BREAST BIOPSY W LOC DEVICE 1ST LESION LEFT 3/13/2024 [facility] ULTRASOUND       Social History     Socioeconomic History    Marital status:      Spouse name: Not on file    Number of children: Not on file    Years of education: Not on file    Highest education level: Not on file   Occupational History    Not on file   Tobacco Use    Smoking status: Former     Current packs/day: 0.00     Average packs/day: 1 pack/day for 7.0 years (7.0 ttl pk-yrs)     Types: Cigarettes     Start date: 1978     Quit date: 1985     Years since quittin.4    Smokeless tobacco: Never   Vaping Use    Vaping Use: Never used   Substance and Sexual Activity    Alcohol use: Yes     Comment: socially on occassion    Drug use: Not on file    Sexual activity: Yes     Partners:

## 2024-06-20 NOTE — ASSESSMENT & PLAN NOTE
Chronic, controlled. Most recent A1c 5.6. Not currently on medication. Continue to work on diet/ exercise.

## 2024-06-20 NOTE — ASSESSMENT & PLAN NOTE
Chronic, stable and well-controlled. Continue hydrochlorothiazide 25 mg daily and lisinopril 20 mg daily.  Continue working on low-salt diet, active lifestyle and attempting weight loss.

## 2024-06-20 NOTE — ASSESSMENT & PLAN NOTE
Chronic, increased in size on recent imaging. Continue with planned surgery and follow up with GYN.

## 2024-06-20 NOTE — ASSESSMENT & PLAN NOTE
Chronic, controlled. Continue duloxetine 30 mg daily for dual benefit of anxiety and chronic pain.

## 2024-06-20 NOTE — ASSESSMENT & PLAN NOTE
EKG today with possible old anterior infarct. Given EKG changes and family history of CAD, checking stress test prior to surgery.  If stress test is normal pt is cleared for surgery. If abnormal, will need to see cardiology for further evaluation. Reviewed most recent blood work with pt.

## 2024-06-24 ENCOUNTER — HOSPITAL ENCOUNTER (OUTPATIENT)
Dept: MRI IMAGING | Age: 59
Discharge: HOME | End: 2024-06-24
Payer: COMMERCIAL

## 2024-06-24 DIAGNOSIS — M54.12 CERVICAL RADICULOPATHY: ICD-10-CM

## 2024-06-24 PROCEDURE — 72141 MRI NECK SPINE W/O DYE: CPT

## 2024-07-02 ENCOUNTER — HOSPITAL ENCOUNTER (OUTPATIENT)
Age: 59
Discharge: HOME | End: 2024-07-04
Payer: COMMERCIAL

## 2024-07-02 VITALS
WEIGHT: 200 LBS | DIASTOLIC BLOOD PRESSURE: 77 MMHG | HEART RATE: 70 BPM | HEIGHT: 63 IN | SYSTOLIC BLOOD PRESSURE: 114 MMHG | BODY MASS INDEX: 35.44 KG/M2

## 2024-07-02 DIAGNOSIS — Z01.818 PREOP EXAM FOR INTERNAL MEDICINE: ICD-10-CM

## 2024-07-02 LAB
ECHO BSA: 2.01 M2
STRESS BASELINE DIAS BP: 77 MMHG
STRESS BASELINE HR: 68 BPM
STRESS BASELINE ST DEPRESSION: 0 MM
STRESS BASELINE SYS BP: 114 MMHG
STRESS ESTIMATED WORKLOAD: 6 METS
STRESS EXERCISE DUR MIN: 3 MIN
STRESS EXERCISE DUR SEC: 35 SEC
STRESS O2 SAT PEAK: 96 %
STRESS O2 SAT REST: 95 %
STRESS PEAK DIAS BP: 61 MMHG
STRESS PEAK SYS BP: 121 MMHG
STRESS PERCENT HR ACHIEVED: 96 %
STRESS POST PEAK HR: 153 BPM
STRESS RATE PRESSURE PRODUCT: NORMAL BPM*MMHG
STRESS TARGET HR: 160 BPM

## 2024-07-02 PROCEDURE — 93017 CV STRESS TEST TRACING ONLY: CPT

## 2024-07-02 PROCEDURE — 93018 CV STRESS TEST I&R ONLY: CPT | Performed by: INTERNAL MEDICINE

## 2024-07-02 PROCEDURE — 93016 CV STRESS TEST SUPVJ ONLY: CPT | Performed by: INTERNAL MEDICINE

## 2024-07-15 DIAGNOSIS — E83.52 HYPERCALCEMIA: ICD-10-CM

## 2024-07-15 DIAGNOSIS — E21.3 HYPERPARATHYROIDISM (HCC): Primary | ICD-10-CM

## 2024-07-18 ENCOUNTER — OFFICE VISIT (OUTPATIENT)
Dept: INTERNAL MEDICINE CLINIC | Age: 59
End: 2024-07-18
Payer: COMMERCIAL

## 2024-07-18 VITALS
HEART RATE: 74 BPM | HEIGHT: 63 IN | SYSTOLIC BLOOD PRESSURE: 132 MMHG | OXYGEN SATURATION: 98 % | DIASTOLIC BLOOD PRESSURE: 78 MMHG | WEIGHT: 201.2 LBS | BODY MASS INDEX: 35.65 KG/M2

## 2024-07-18 DIAGNOSIS — F41.9 ANXIETY: ICD-10-CM

## 2024-07-18 DIAGNOSIS — Z82.49 FAMILY HISTORY OF CORONARY ARTERY DISEASE: ICD-10-CM

## 2024-07-18 DIAGNOSIS — N83.201 CYST OF RIGHT OVARY: ICD-10-CM

## 2024-07-18 DIAGNOSIS — Z01.818 PREOP EXAM FOR INTERNAL MEDICINE: ICD-10-CM

## 2024-07-18 DIAGNOSIS — G89.4 CHRONIC PAIN DISORDER: ICD-10-CM

## 2024-07-18 DIAGNOSIS — K21.9 GASTROESOPHAGEAL REFLUX DISEASE, UNSPECIFIED WHETHER ESOPHAGITIS PRESENT: ICD-10-CM

## 2024-07-18 DIAGNOSIS — E55.9 VITAMIN D DEFICIENCY: ICD-10-CM

## 2024-07-18 DIAGNOSIS — E21.3 HYPERPARATHYROIDISM (HCC): ICD-10-CM

## 2024-07-18 DIAGNOSIS — R73.01 FASTING HYPERGLYCEMIA: ICD-10-CM

## 2024-07-18 DIAGNOSIS — I10 ESSENTIAL HYPERTENSION, BENIGN: ICD-10-CM

## 2024-07-18 DIAGNOSIS — Z01.818 PREOP EXAM FOR INTERNAL MEDICINE: Primary | ICD-10-CM

## 2024-07-18 DIAGNOSIS — E89.0 POSTABLATIVE HYPOTHYROIDISM: ICD-10-CM

## 2024-07-18 LAB
DEPRECATED RDW RBC AUTO: 14.3 % (ref 12.4–15.4)
HCT VFR BLD AUTO: 37.3 % (ref 36–48)
HGB BLD-MCNC: 12.2 G/DL (ref 12–16)
MCH RBC QN AUTO: 28.8 PG (ref 26–34)
MCHC RBC AUTO-ENTMCNC: 32.8 G/DL (ref 31–36)
MCV RBC AUTO: 87.8 FL (ref 80–100)
PLATELET # BLD AUTO: 297 K/UL (ref 135–450)
PLATELET BLD QL SMEAR: ADEQUATE
PMV BLD AUTO: 8.9 FL (ref 5–10.5)
RBC # BLD AUTO: 4.25 M/UL (ref 4–5.2)
SLIDE REVIEW: NORMAL
WBC # BLD AUTO: 7.7 K/UL (ref 4–11)

## 2024-07-18 PROCEDURE — 3017F COLORECTAL CA SCREEN DOC REV: CPT | Performed by: NURSE PRACTITIONER

## 2024-07-18 PROCEDURE — 99214 OFFICE O/P EST MOD 30 MIN: CPT | Performed by: NURSE PRACTITIONER

## 2024-07-18 PROCEDURE — G8417 CALC BMI ABV UP PARAM F/U: HCPCS | Performed by: NURSE PRACTITIONER

## 2024-07-18 PROCEDURE — G2211 COMPLEX E/M VISIT ADD ON: HCPCS | Performed by: NURSE PRACTITIONER

## 2024-07-18 PROCEDURE — 1036F TOBACCO NON-USER: CPT | Performed by: NURSE PRACTITIONER

## 2024-07-18 PROCEDURE — 3075F SYST BP GE 130 - 139MM HG: CPT | Performed by: NURSE PRACTITIONER

## 2024-07-18 PROCEDURE — G8427 DOCREV CUR MEDS BY ELIG CLIN: HCPCS | Performed by: NURSE PRACTITIONER

## 2024-07-18 PROCEDURE — 3078F DIAST BP <80 MM HG: CPT | Performed by: NURSE PRACTITIONER

## 2024-07-18 ASSESSMENT — ENCOUNTER SYMPTOMS
COUGH: 0
CHEST TIGHTNESS: 0
WHEEZING: 0
SHORTNESS OF BREATH: 0

## 2024-07-18 NOTE — ASSESSMENT & PLAN NOTE
Perioperative risk related to the patient's upcoming surgery is considered acceptable.  Pt is medically cleared for surgery.  DVT prophylaxis per surgery team.  Requested work up: cbc (per chart review in care everywhere). She recently had a negative stress test on 7/2/24. Pre-op exam was completed on 7/18/24.

## 2024-07-18 NOTE — ASSESSMENT & PLAN NOTE
She has good lipid control with diet following bariatric surgery. She is not on a statin due to chronic pain/ myalgias. Negative stress test 7/2/2024

## 2024-07-18 NOTE — PROGRESS NOTES
diet, active lifestyle and attempting weight loss.   6. Family history of coronary artery disease  Assessment & Plan:  She has good lipid control with diet following bariatric surgery. She is not on a statin due to chronic pain/ myalgias. Negative stress test 7/2/2024  7. Fasting hyperglycemia  Assessment & Plan:  Chronic, controlled. Most recent A1c 5.6. Not currently on medication. Continue to work on diet/ exercise.   8. Gastroesophageal reflux disease, unspecified whether esophagitis present  Assessment & Plan:   Chronic, controlled on PPI prn.  Avoid dietary triggers.   9. Postablative hypothyroidism  Assessment & Plan:   Chronic, controlled. Managed by endocrinology. Continue levothyroxine and follow up.   10. Hyperparathyroidism (HCC)  Assessment & Plan:   Continue care and recommendation as per endocrinology.   11. Vitamin D deficiency  Assessment & Plan:  Chronic, stable on supplement.     Electronically signed by DEEPAK Arreguin CNP on 7/18/2024 at 8:57 AM

## 2024-07-20 PROBLEM — Z01.818 PREOP EXAM FOR INTERNAL MEDICINE: Status: RESOLVED | Noted: 2024-06-20 | Resolved: 2024-07-20

## 2024-08-13 RX ORDER — IPRATROPIUM BROMIDE 42 UG/1
SPRAY, METERED NASAL
Qty: 15 ML | Refills: 0 | Status: SHIPPED | OUTPATIENT
Start: 2024-08-13

## 2024-08-19 ENCOUNTER — TELEPHONE (OUTPATIENT)
Dept: INTERNAL MEDICINE CLINIC | Age: 59
End: 2024-08-19

## 2024-08-19 DIAGNOSIS — R92.8 ABNORMAL MAMMOGRAM OF LEFT BREAST: Primary | ICD-10-CM

## 2024-08-19 NOTE — TELEPHONE ENCOUNTER
[facility] Radiology Scheduling calling. Pt needs order for US left breast limited. Please advise.

## 2024-08-22 ENCOUNTER — PATIENT MESSAGE (OUTPATIENT)
Dept: INTERNAL MEDICINE CLINIC | Age: 59
End: 2024-08-22

## 2024-09-11 DIAGNOSIS — E03.9 ACQUIRED HYPOTHYROIDISM: ICD-10-CM

## 2024-09-11 DIAGNOSIS — I10 ESSENTIAL HYPERTENSION, BENIGN: ICD-10-CM

## 2024-09-11 RX ORDER — LEVOTHYROXINE SODIUM 50 UG/1
TABLET ORAL
Qty: 90 TABLET | Refills: 1 | Status: SHIPPED | OUTPATIENT
Start: 2024-09-11

## 2024-09-11 RX ORDER — LISINOPRIL 20 MG/1
20 TABLET ORAL DAILY
Qty: 90 TABLET | Refills: 1 | Status: SHIPPED | OUTPATIENT
Start: 2024-09-11

## 2024-09-17 ENCOUNTER — HOSPITAL ENCOUNTER (OUTPATIENT)
Dept: ULTRASOUND IMAGING | Age: 59
Discharge: HOME | End: 2024-09-17
Payer: COMMERCIAL

## 2024-09-17 DIAGNOSIS — R92.8 ABNORMAL MAMMOGRAM OF LEFT BREAST: ICD-10-CM

## 2024-09-17 PROCEDURE — 76642 ULTRASOUND BREAST LIMITED: CPT

## 2024-10-07 ENCOUNTER — OFFICE VISIT (OUTPATIENT)
Dept: INTERNAL MEDICINE CLINIC | Age: 59
End: 2024-10-07
Payer: COMMERCIAL

## 2024-10-07 VITALS
WEIGHT: 196.8 LBS | BODY MASS INDEX: 34.87 KG/M2 | SYSTOLIC BLOOD PRESSURE: 120 MMHG | DIASTOLIC BLOOD PRESSURE: 86 MMHG | OXYGEN SATURATION: 99 % | HEART RATE: 73 BPM | HEIGHT: 63 IN

## 2024-10-07 DIAGNOSIS — Z83.3 FAMILY HISTORY OF DIABETES MELLITUS (DM): ICD-10-CM

## 2024-10-07 DIAGNOSIS — E21.3 HYPERPARATHYROIDISM (HCC): ICD-10-CM

## 2024-10-07 DIAGNOSIS — I10 ESSENTIAL HYPERTENSION, BENIGN: ICD-10-CM

## 2024-10-07 DIAGNOSIS — E55.9 VITAMIN D DEFICIENCY: ICD-10-CM

## 2024-10-07 DIAGNOSIS — Z82.49 FAMILY HISTORY OF CORONARY ARTERY DISEASE: ICD-10-CM

## 2024-10-07 DIAGNOSIS — R42 VERTIGO: ICD-10-CM

## 2024-10-07 DIAGNOSIS — E89.0 POSTABLATIVE HYPOTHYROIDISM: ICD-10-CM

## 2024-10-07 DIAGNOSIS — J06.9 VIRAL URI: Primary | ICD-10-CM

## 2024-10-07 PROCEDURE — 3017F COLORECTAL CA SCREEN DOC REV: CPT | Performed by: NURSE PRACTITIONER

## 2024-10-07 PROCEDURE — G8484 FLU IMMUNIZE NO ADMIN: HCPCS | Performed by: NURSE PRACTITIONER

## 2024-10-07 PROCEDURE — 99214 OFFICE O/P EST MOD 30 MIN: CPT | Performed by: NURSE PRACTITIONER

## 2024-10-07 PROCEDURE — G8427 DOCREV CUR MEDS BY ELIG CLIN: HCPCS | Performed by: NURSE PRACTITIONER

## 2024-10-07 PROCEDURE — 3074F SYST BP LT 130 MM HG: CPT | Performed by: NURSE PRACTITIONER

## 2024-10-07 PROCEDURE — 1036F TOBACCO NON-USER: CPT | Performed by: NURSE PRACTITIONER

## 2024-10-07 PROCEDURE — 3079F DIAST BP 80-89 MM HG: CPT | Performed by: NURSE PRACTITIONER

## 2024-10-07 PROCEDURE — G8417 CALC BMI ABV UP PARAM F/U: HCPCS | Performed by: NURSE PRACTITIONER

## 2024-10-07 RX ORDER — MECLIZINE HYDROCHLORIDE 25 MG/1
25 TABLET ORAL 3 TIMES DAILY PRN
Qty: 20 TABLET | Refills: 0 | Status: SHIPPED | OUTPATIENT
Start: 2024-10-07 | End: 2024-10-17

## 2024-10-07 NOTE — ASSESSMENT & PLAN NOTE
Chronic, stable and well-controlled. Continue hydrochlorothiazide 25 mg daily and lisinopril 20 mg daily. Continue working on low-salt diet, active lifestyle and attempting weight loss. Checking blood work.     Orders:    Comprehensive Metabolic Panel; Future    CBC; Future

## 2024-10-07 NOTE — PROGRESS NOTES
10/7/24     Chief Complaint   Patient presents with    Follow-up     Medication follow up     Cough     Only when sleeping or laying down, feels more dry rather than sore X 2 days    Pharyngitis     Dry cough X 2 days     Headache     X2 days     Dizziness     Has noticed her BP has been a little low the past couple days (101/66) and think this might be related        HPI    Complains of a 2 day history of cough, congestion, mild sore throat, HA, fatigue, PND. Denies fever, chills or changes to chronic body aches.  Intermittent dizziness with fast movements has been exacerbated the past 2 days, described as vertigo. Working on diet and limiting sugar, gave up red meat. Has been sleeping with the windows open, does not usually have drainage at night.       Taking celebrex and tylenol at night for her pain.  Seeing luz for neck / back pain, failed PT.   Had cortisone shot that did not help.  Luz has recommended surgery. She has an appt tomorrow with ortho for left shoulder and arm pain.     Seeing Dr. Vasquez with endocrinology, needs to reschedule her appt. Requesting lab work to be ordered before the end of the year.     Allergies   Allergen Reactions    Morphine Itching       Current Outpatient Medications   Medication Sig Dispense Refill    levothyroxine (SYNTHROID) 50 MCG tablet TAKE 1 TABLET BY MOUTH DAILY 90 tablet 1    lisinopril (PRINIVIL;ZESTRIL) 20 MG tablet TAKE 1 TABLET BY MOUTH DAILY 90 tablet 1    ipratropium (ATROVENT) 0.06 % nasal spray SPRAY 2 SPRAYS IN EACH NOSTRIL ONCE DAILY 15 mL 0    hydroCHLOROthiazide (HYDRODIURIL) 25 MG tablet TAKE ONE TABLET BY MOUTH DAILY 90 tablet 1    DULoxetine (CYMBALTA) 30 MG extended release capsule TAKE 1 CAPSULE BY MOUTH DAILY 90 capsule 1    Probiotic Product (PROBIOTIC DAILY PO) Take by mouth      VITAMIN D-VITAMIN K PO Take by mouth      celecoxib (CELEBREX) 100 MG capsule Take 1 capsule by mouth daily as needed for Pain 90 capsule 1    calcium-vitamin D

## 2024-10-07 NOTE — ASSESSMENT & PLAN NOTE
checking labs, keep appt with endocrinology as scheduled.     Orders:    TSH with Reflex to FT4 ([place] Only); Future    PTH, Intact; Future

## 2024-10-07 NOTE — ASSESSMENT & PLAN NOTE
labs ordered, keep appt with endocrinology as scheduled.     Orders:    TSH with Reflex to FT4 ([place] Only); Future

## 2024-10-11 ENCOUNTER — TELEPHONE (OUTPATIENT)
Dept: INTERNAL MEDICINE CLINIC | Age: 59
End: 2024-10-11

## 2024-10-11 RX ORDER — IPRATROPIUM BROMIDE 42 UG/1
SPRAY, METERED NASAL
Qty: 15 ML | Refills: 3 | Status: SHIPPED | OUTPATIENT
Start: 2024-10-11

## 2024-10-11 NOTE — TELEPHONE ENCOUNTER
Last OV: 10/7/2024  Next OV: Visit date not found    Next appointment due: 04/07/25    Last fill: 08/13/24  Refills: 0    Ok to refill

## 2024-10-11 NOTE — TELEPHONE ENCOUNTER
Pt calling requesting refill of DULoxetine (CYMBALTA) 30 MG extended release capsule     Last written 5/28/24  Last OV 10/7/24  Next OV N/A    Please send to [facility] on [address]

## 2024-10-11 NOTE — TELEPHONE ENCOUNTER
Last OV: 10/7/2024  Next OV: not scheduled    Next appointment due:4/7/25    Medication should not be due until November. LMTCB

## 2024-10-14 DIAGNOSIS — E21.3 HYPERPARATHYROIDISM (HCC): ICD-10-CM

## 2024-10-14 DIAGNOSIS — I10 ESSENTIAL HYPERTENSION, BENIGN: ICD-10-CM

## 2024-10-14 DIAGNOSIS — E55.9 VITAMIN D DEFICIENCY: ICD-10-CM

## 2024-10-14 DIAGNOSIS — Z82.49 FAMILY HISTORY OF CORONARY ARTERY DISEASE: ICD-10-CM

## 2024-10-14 DIAGNOSIS — E89.0 POSTABLATIVE HYPOTHYROIDISM: ICD-10-CM

## 2024-10-14 DIAGNOSIS — Z83.3 FAMILY HISTORY OF DIABETES MELLITUS (DM): ICD-10-CM

## 2024-10-14 LAB
25(OH)D3 SERPL-MCNC: 52.9 NG/ML
ALBUMIN SERPL-MCNC: 4.5 G/DL (ref 3.4–5)
ALBUMIN/GLOB SERPL: 1.9 {RATIO} (ref 1.1–2.2)
ALP SERPL-CCNC: 95 U/L (ref 40–129)
ALT SERPL-CCNC: 17 U/L (ref 10–40)
ANION GAP SERPL CALCULATED.3IONS-SCNC: 10 MMOL/L (ref 3–16)
AST SERPL-CCNC: 17 U/L (ref 15–37)
BILIRUB SERPL-MCNC: 0.3 MG/DL (ref 0–1)
BUN SERPL-MCNC: 32 MG/DL (ref 7–20)
CALCIUM SERPL-MCNC: 11.4 MG/DL (ref 8.3–10.6)
CHLORIDE SERPL-SCNC: 102 MMOL/L (ref 99–110)
CHOLEST SERPL-MCNC: 125 MG/DL (ref 0–199)
CO2 SERPL-SCNC: 27 MMOL/L (ref 21–32)
CREAT SERPL-MCNC: 0.9 MG/DL (ref 0.6–1.2)
DEPRECATED RDW RBC AUTO: 14.2 % (ref 12.4–15.4)
EST. AVERAGE GLUCOSE BLD GHB EST-MCNC: 116.9 MG/DL
GFR SERPLBLD CREATININE-BSD FMLA CKD-EPI: 73 ML/MIN/{1.73_M2}
GLUCOSE SERPL-MCNC: 97 MG/DL (ref 70–99)
HBA1C MFR BLD: 5.7 %
HCT VFR BLD AUTO: 38.9 % (ref 36–48)
HDLC SERPL-MCNC: 50 MG/DL (ref 40–60)
HGB BLD-MCNC: 13 G/DL (ref 12–16)
LDLC SERPL CALC-MCNC: 51 MG/DL
MCH RBC QN AUTO: 29.1 PG (ref 26–34)
MCHC RBC AUTO-ENTMCNC: 33.3 G/DL (ref 31–36)
MCV RBC AUTO: 87.2 FL (ref 80–100)
PLATELET # BLD AUTO: 362 K/UL (ref 135–450)
PMV BLD AUTO: 8.8 FL (ref 5–10.5)
POTASSIUM SERPL-SCNC: 4.2 MMOL/L (ref 3.5–5.1)
PROT SERPL-MCNC: 6.9 G/DL (ref 6.4–8.2)
PTH-INTACT SERPL-MCNC: 128 PG/ML (ref 14–72)
RBC # BLD AUTO: 4.46 M/UL (ref 4–5.2)
SODIUM SERPL-SCNC: 139 MMOL/L (ref 136–145)
T4 FREE SERPL-MCNC: 1.2 NG/DL (ref 0.9–1.8)
TRIGL SERPL-MCNC: 122 MG/DL (ref 0–150)
TSH SERPL DL<=0.005 MIU/L-ACNC: 4.79 UIU/ML (ref 0.27–4.2)
VLDLC SERPL CALC-MCNC: 24 MG/DL
WBC # BLD AUTO: 8.5 K/UL (ref 4–11)

## 2025-02-12 DIAGNOSIS — I10 ESSENTIAL HYPERTENSION, BENIGN: ICD-10-CM

## 2025-02-12 RX ORDER — HYDROCHLOROTHIAZIDE 25 MG/1
TABLET ORAL
Qty: 90 TABLET | Refills: 0 | Status: SHIPPED | OUTPATIENT
Start: 2025-02-12

## 2025-03-14 DIAGNOSIS — I10 ESSENTIAL HYPERTENSION, BENIGN: ICD-10-CM

## 2025-03-14 DIAGNOSIS — E03.9 ACQUIRED HYPOTHYROIDISM: ICD-10-CM

## 2025-03-14 RX ORDER — LISINOPRIL 20 MG/1
20 TABLET ORAL DAILY
Qty: 90 TABLET | Refills: 1 | Status: SHIPPED | OUTPATIENT
Start: 2025-03-14

## 2025-03-14 RX ORDER — LEVOTHYROXINE SODIUM 50 UG/1
50 TABLET ORAL DAILY
Qty: 90 TABLET | Refills: 1 | Status: SHIPPED | OUTPATIENT
Start: 2025-03-14

## 2025-03-14 NOTE — TELEPHONE ENCOUNTER
Last OV: 10/7/2024  Next OV: Visit date not found    Next appointment due: Return in 6 months (on 4/7/2025)     Last fill: 9/11/2024, 9/11/2024  Refills: #90/1, #90/1    Patient needs to schedule follow-up appointment  Sent [employer] scheduling message

## 2025-03-19 ENCOUNTER — OFFICE VISIT (OUTPATIENT)
Dept: INTERNAL MEDICINE CLINIC | Age: 60
End: 2025-03-19
Payer: COMMERCIAL

## 2025-03-19 VITALS
OXYGEN SATURATION: 99 % | DIASTOLIC BLOOD PRESSURE: 80 MMHG | HEIGHT: 63 IN | HEART RATE: 80 BPM | BODY MASS INDEX: 34.8 KG/M2 | WEIGHT: 196.4 LBS | SYSTOLIC BLOOD PRESSURE: 130 MMHG | TEMPERATURE: 98.6 F

## 2025-03-19 DIAGNOSIS — Z13.1 SCREENING FOR DIABETES MELLITUS: ICD-10-CM

## 2025-03-19 DIAGNOSIS — K21.9 GASTROESOPHAGEAL REFLUX DISEASE WITHOUT ESOPHAGITIS: ICD-10-CM

## 2025-03-19 DIAGNOSIS — Z13.220 SCREENING FOR HYPERLIPIDEMIA: ICD-10-CM

## 2025-03-19 DIAGNOSIS — E03.9 ACQUIRED HYPOTHYROIDISM: Primary | ICD-10-CM

## 2025-03-19 DIAGNOSIS — E07.9 SWELLING OF THYROID GLAND: ICD-10-CM

## 2025-03-19 DIAGNOSIS — E21.3 HYPERPARATHYROIDISM: ICD-10-CM

## 2025-03-19 DIAGNOSIS — E03.9 ACQUIRED HYPOTHYROIDISM: ICD-10-CM

## 2025-03-19 DIAGNOSIS — R22.1 LOCALIZED SWELLING, MASS AND LUMP, NECK: ICD-10-CM

## 2025-03-19 LAB
ALBUMIN SERPL-MCNC: 4 G/DL (ref 3.4–5)
ALP SERPL-CCNC: 93 U/L (ref 40–129)
ALT SERPL-CCNC: 19 U/L (ref 10–40)
ANION GAP SERPL CALCULATED.3IONS-SCNC: 9 MMOL/L (ref 3–16)
AST SERPL-CCNC: 22 U/L (ref 15–37)
BASOPHILS # BLD: 0.1 K/UL (ref 0–0.2)
BASOPHILS NFR BLD: 0.7 %
BILIRUB DIRECT SERPL-MCNC: <0.1 MG/DL (ref 0–0.3)
BILIRUB INDIRECT SERPL-MCNC: ABNORMAL MG/DL (ref 0–1)
BILIRUB SERPL-MCNC: <0.2 MG/DL (ref 0–1)
BUN SERPL-MCNC: 22 MG/DL (ref 7–20)
CALCIUM SERPL-MCNC: 11.1 MG/DL (ref 8.3–10.6)
CHLORIDE SERPL-SCNC: 105 MMOL/L (ref 99–110)
CHOLEST SERPL-MCNC: 133 MG/DL (ref 0–199)
CO2 SERPL-SCNC: 27 MMOL/L (ref 21–32)
CREAT SERPL-MCNC: 1.2 MG/DL (ref 0.6–1.2)
CREAT UR-MCNC: 141 MG/DL (ref 28–259)
DEPRECATED RDW RBC AUTO: 14.2 % (ref 12.4–15.4)
EOSINOPHIL # BLD: 0.1 K/UL (ref 0–0.6)
EOSINOPHIL NFR BLD: 0.8 %
GFR SERPLBLD CREATININE-BSD FMLA CKD-EPI: 52 ML/MIN/{1.73_M2}
GLUCOSE SERPL-MCNC: 131 MG/DL (ref 70–99)
HCT VFR BLD AUTO: 37.8 % (ref 36–48)
HDLC SERPL-MCNC: 49 MG/DL (ref 40–60)
HGB BLD-MCNC: 12.5 G/DL (ref 12–16)
LDLC SERPL CALC-MCNC: 40 MG/DL
LYMPHOCYTES # BLD: 1.6 K/UL (ref 1–5.1)
LYMPHOCYTES NFR BLD: 18.3 %
MCH RBC QN AUTO: 29.2 PG (ref 26–34)
MCHC RBC AUTO-ENTMCNC: 33 G/DL (ref 31–36)
MCV RBC AUTO: 88.5 FL (ref 80–100)
MICROALBUMIN UR DL<=1MG/L-MCNC: <1.2 MG/DL
MICROALBUMIN/CREAT UR: NORMAL MG/G (ref 0–30)
MONOCYTES # BLD: 0.5 K/UL (ref 0–1.3)
MONOCYTES NFR BLD: 5.3 %
NEUTROPHILS # BLD: 6.6 K/UL (ref 1.7–7.7)
NEUTROPHILS NFR BLD: 74.9 %
PLATELET # BLD AUTO: 326 K/UL (ref 135–450)
PMV BLD AUTO: 9 FL (ref 5–10.5)
POTASSIUM SERPL-SCNC: 3.9 MMOL/L (ref 3.5–5.1)
PROT SERPL-MCNC: 6.3 G/DL (ref 6.4–8.2)
PTH-INTACT SERPL-MCNC: 181 PG/ML (ref 14–72)
RBC # BLD AUTO: 4.27 M/UL (ref 4–5.2)
SODIUM SERPL-SCNC: 141 MMOL/L (ref 136–145)
TRIGL SERPL-MCNC: 218 MG/DL (ref 0–150)
TSH SERPL DL<=0.005 MIU/L-ACNC: 0.85 UIU/ML (ref 0.27–4.2)
VLDLC SERPL CALC-MCNC: 44 MG/DL
WBC # BLD AUTO: 8.8 K/UL (ref 4–11)

## 2025-03-19 PROCEDURE — 3075F SYST BP GE 130 - 139MM HG: CPT

## 2025-03-19 PROCEDURE — G8417 CALC BMI ABV UP PARAM F/U: HCPCS

## 2025-03-19 PROCEDURE — 99214 OFFICE O/P EST MOD 30 MIN: CPT

## 2025-03-19 PROCEDURE — 3079F DIAST BP 80-89 MM HG: CPT

## 2025-03-19 PROCEDURE — 1036F TOBACCO NON-USER: CPT

## 2025-03-19 PROCEDURE — G8427 DOCREV CUR MEDS BY ELIG CLIN: HCPCS

## 2025-03-19 PROCEDURE — 3017F COLORECTAL CA SCREEN DOC REV: CPT

## 2025-03-19 RX ORDER — OMEPRAZOLE 20 MG/1
20 CAPSULE, DELAYED RELEASE ORAL DAILY
Qty: 30 CAPSULE | Refills: 0 | Status: SHIPPED | OUTPATIENT
Start: 2025-03-19 | End: 2025-04-18

## 2025-03-19 SDOH — ECONOMIC STABILITY: FOOD INSECURITY: WITHIN THE PAST 12 MONTHS, THE FOOD YOU BOUGHT JUST DIDN'T LAST AND YOU DIDN'T HAVE MONEY TO GET MORE.: NEVER TRUE

## 2025-03-19 SDOH — ECONOMIC STABILITY: FOOD INSECURITY: WITHIN THE PAST 12 MONTHS, YOU WORRIED THAT YOUR FOOD WOULD RUN OUT BEFORE YOU GOT MONEY TO BUY MORE.: NEVER TRUE

## 2025-03-19 ASSESSMENT — PATIENT HEALTH QUESTIONNAIRE - PHQ9
8. MOVING OR SPEAKING SO SLOWLY THAT OTHER PEOPLE COULD HAVE NOTICED. OR THE OPPOSITE, BEING SO FIGETY OR RESTLESS THAT YOU HAVE BEEN MOVING AROUND A LOT MORE THAN USUAL: NOT AT ALL
SUM OF ALL RESPONSES TO PHQ QUESTIONS 1-9: 1
7. TROUBLE CONCENTRATING ON THINGS, SUCH AS READING THE NEWSPAPER OR WATCHING TELEVISION: NOT AT ALL
SUM OF ALL RESPONSES TO PHQ QUESTIONS 1-9: 1
6. FEELING BAD ABOUT YOURSELF - OR THAT YOU ARE A FAILURE OR HAVE LET YOURSELF OR YOUR FAMILY DOWN: NOT AT ALL
1. LITTLE INTEREST OR PLEASURE IN DOING THINGS: NOT AT ALL
4. FEELING TIRED OR HAVING LITTLE ENERGY: SEVERAL DAYS
2. FEELING DOWN, DEPRESSED OR HOPELESS: NOT AT ALL
10. IF YOU CHECKED OFF ANY PROBLEMS, HOW DIFFICULT HAVE THESE PROBLEMS MADE IT FOR YOU TO DO YOUR WORK, TAKE CARE OF THINGS AT HOME, OR GET ALONG WITH OTHER PEOPLE: NOT DIFFICULT AT ALL
3. TROUBLE FALLING OR STAYING ASLEEP: NOT AT ALL
5. POOR APPETITE OR OVEREATING: NOT AT ALL
9. THOUGHTS THAT YOU WOULD BE BETTER OFF DEAD, OR OF HURTING YOURSELF: NOT AT ALL
SUM OF ALL RESPONSES TO PHQ QUESTIONS 1-9: 1
SUM OF ALL RESPONSES TO PHQ QUESTIONS 1-9: 1

## 2025-03-19 NOTE — PROGRESS NOTES
Nicol Rebolledo (:  1965) is a 59 y.o. female,Established patient, here for evaluation of the following chief complaint(s):  Other (Knot on left side of neck)      Assessment & Plan   ASSESSMENT/PLAN:  Assessment & Plan  Localized swelling, mass and lump, neck  Presents today for acute visit.  Has noticed the swelling and lump/lymph node on the left side of her neck for the last 8 weeks.  States that it was initially a size of a pea and has progressively grown with some soreness to touch.  States that she had COVID 10 weeks ago and thinks it may have been related to her COVID infection.  Denies any other lymph node swelling, rash, recent infections, recent travel or sick contacts.  Does not have any history of HIV, TB exposure or malignancy.  Of note, she does have a history of hypothyroidism and hyperparathyroidism, which has been stable and currently on levothyroxine.  Denies any symptoms of hypo or hyperparathyroidism. Denies any fevers, night sweats, weight loss or fatigue.     Vitals are stable.  Does not have any respiratory distress or any difficulty swallowing.  She is not toxic or ill-appearing.  No stridor on examination.    I have ordered routine blood work, as well as TSH and PTH levels.  Have ordered thyroid ultrasound as well as soft tissue ultrasound for further evaluation.     Patient was instructed to watch out for worsening symptoms including but not limited to fevers, chills, lightheadedness, headache, vision changes, shortness of breath, chest pain, nausea, vomiting, bleeding, syncope etc. If their clinical condition worsened, patient was instructed to either call the office or go straight to the ED.  Patient expressed understanding and agrees with the plan.    Orders:    US SOFT TISSUE LIMITED AREA; Future    Swelling of thyroid gland  See above    Orders:    US THYROID; Future    TSH reflex to FT4; Future    PTH, Intact; Future    Acquired hypothyroidism     Orders:    TSH reflex to

## 2025-03-19 NOTE — ASSESSMENT & PLAN NOTE
Having some acid reflux symptoms.  She has been not taking her omeprazole and has been trying herbal over-the-counter products to avoid over medications.  Had extensive discussion about risk benefits of treatment.  She is willing to start treatment today, prescription sent to pharmacy.  Denies any dysphagia, GI bleeding, or any red flag symptoms    Orders:    omeprazole (PRILOSEC) 20 MG delayed release capsule; Take 1 capsule by mouth Daily

## 2025-03-20 ENCOUNTER — RESULTS FOLLOW-UP (OUTPATIENT)
Dept: INTERNAL MEDICINE CLINIC | Age: 60
End: 2025-03-20

## 2025-03-20 LAB
EST. AVERAGE GLUCOSE BLD GHB EST-MCNC: 114 MG/DL
HBA1C MFR BLD: 5.6 %

## 2025-03-24 ENCOUNTER — HOSPITAL ENCOUNTER (OUTPATIENT)
Dept: ULTRASOUND IMAGING | Age: 60
Discharge: HOME | End: 2025-03-24
Payer: COMMERCIAL

## 2025-03-24 DIAGNOSIS — E07.9 SWELLING OF THYROID GLAND: ICD-10-CM

## 2025-03-24 DIAGNOSIS — R22.1 LOCALIZED SWELLING, MASS AND LUMP, NECK: ICD-10-CM

## 2025-03-24 PROCEDURE — 76536 US EXAM OF HEAD AND NECK: CPT

## 2025-03-24 PROCEDURE — 76999 ECHO EXAMINATION PROCEDURE: CPT

## 2025-03-26 ENCOUNTER — TELEPHONE (OUTPATIENT)
Dept: ENDOCRINOLOGY | Age: 60
End: 2025-03-26

## 2025-03-27 ENCOUNTER — RESULTS FOLLOW-UP (OUTPATIENT)
Dept: INTERNAL MEDICINE CLINIC | Age: 60
End: 2025-03-27

## 2025-03-28 ENCOUNTER — RESULTS FOLLOW-UP (OUTPATIENT)
Dept: INTERNAL MEDICINE CLINIC | Age: 60
End: 2025-03-28

## 2025-04-14 DIAGNOSIS — M25.50 MULTIPLE JOINT PAIN: ICD-10-CM

## 2025-04-14 DIAGNOSIS — E03.9 ACQUIRED HYPOTHYROIDISM: ICD-10-CM

## 2025-04-14 RX ORDER — DULOXETIN HYDROCHLORIDE 30 MG/1
CAPSULE, DELAYED RELEASE ORAL DAILY
Qty: 30 CAPSULE | Refills: 0 | Status: SHIPPED | OUTPATIENT
Start: 2025-04-14

## 2025-04-14 RX ORDER — LEVOTHYROXINE SODIUM 50 UG/1
50 TABLET ORAL DAILY
Qty: 90 TABLET | Refills: 1 | Status: SHIPPED | OUTPATIENT
Start: 2025-04-14

## 2025-04-14 NOTE — TELEPHONE ENCOUNTER
Last OV: 3/19/2025  Next OV: Visit date not found    Next appointment due:4/16/2025     Last fill:3/14/25  Refills:1

## 2025-04-14 NOTE — TELEPHONE ENCOUNTER
Pt calling, is no longer using [facility]. Asks if levothyroxine (SYNTHROID) 50 MCG tablet can be sent to [facility] on [address] Please advise.

## 2025-04-29 ENCOUNTER — OFFICE VISIT (OUTPATIENT)
Dept: ENDOCRINOLOGY | Age: 60
End: 2025-04-29
Payer: COMMERCIAL

## 2025-04-29 VITALS
BODY MASS INDEX: 34.38 KG/M2 | SYSTOLIC BLOOD PRESSURE: 124 MMHG | HEIGHT: 63 IN | OXYGEN SATURATION: 98 % | DIASTOLIC BLOOD PRESSURE: 76 MMHG | HEART RATE: 73 BPM | WEIGHT: 194 LBS

## 2025-04-29 DIAGNOSIS — M85.89 OSTEOPENIA OF MULTIPLE SITES: ICD-10-CM

## 2025-04-29 DIAGNOSIS — E21.3 HYPERPARATHYROIDISM: Primary | ICD-10-CM

## 2025-04-29 DIAGNOSIS — E83.52 HYPERCALCEMIA: ICD-10-CM

## 2025-04-29 DIAGNOSIS — E55.9 VITAMIN D DEFICIENCY: ICD-10-CM

## 2025-04-29 DIAGNOSIS — Z98.84 H/O GASTRIC BYPASS: ICD-10-CM

## 2025-04-29 PROCEDURE — G8427 DOCREV CUR MEDS BY ELIG CLIN: HCPCS | Performed by: INTERNAL MEDICINE

## 2025-04-29 PROCEDURE — 3078F DIAST BP <80 MM HG: CPT | Performed by: INTERNAL MEDICINE

## 2025-04-29 PROCEDURE — 3074F SYST BP LT 130 MM HG: CPT | Performed by: INTERNAL MEDICINE

## 2025-04-29 PROCEDURE — 99214 OFFICE O/P EST MOD 30 MIN: CPT | Performed by: INTERNAL MEDICINE

## 2025-04-29 PROCEDURE — G8417 CALC BMI ABV UP PARAM F/U: HCPCS | Performed by: INTERNAL MEDICINE

## 2025-04-29 PROCEDURE — 3017F COLORECTAL CA SCREEN DOC REV: CPT | Performed by: INTERNAL MEDICINE

## 2025-04-29 PROCEDURE — 1036F TOBACCO NON-USER: CPT | Performed by: INTERNAL MEDICINE

## 2025-04-29 RX ORDER — GABAPENTIN 100 MG/1
100 CAPSULE ORAL
COMMUNITY

## 2025-04-29 NOTE — PROGRESS NOTES
collection and labs     Since calcium going up, will repeat 24 hour urine calcium     Check PTH but first hold biotin for three days and then do blood work on Monday     If calcium is high again then I may do 24 hour urine calcium        Restart HCTZ 25 mg daily after labs   May cut down the dose if calcium goes way up     For secondary hyperparathyroidism, restart calcium 500 mg one tab with lunch and two with dinner     2: Postmenopausal osteopenia   May 2022: BMD normal at LS and hips, -1.5 at distal forearm   I calculated FRAX and it is low   Repeat DXA scan now      2: Vit D   52.9 - Oct 2024    C/w  Vit D/K combo     4: Fasting hyperglycemia/ prediabetes   A1C 5.6% < 5.7%     5 Mg def  Takes Mag daily        She is off biotin now     RTC in 6 months        Electronically signed by RONALDO VIDALES MD on 4/29/2025 at 11:52 AM

## 2025-05-06 DIAGNOSIS — I10 ESSENTIAL HYPERTENSION, BENIGN: ICD-10-CM

## 2025-05-06 RX ORDER — LISINOPRIL 20 MG/1
20 TABLET ORAL DAILY
Qty: 90 TABLET | Refills: 1 | Status: SHIPPED | OUTPATIENT
Start: 2025-05-06

## 2025-05-13 DIAGNOSIS — Z98.84 H/O GASTRIC BYPASS: ICD-10-CM

## 2025-05-13 DIAGNOSIS — E21.3 HYPERPARATHYROIDISM: ICD-10-CM

## 2025-05-13 DIAGNOSIS — E55.9 VITAMIN D DEFICIENCY: ICD-10-CM

## 2025-05-13 DIAGNOSIS — E83.52 HYPERCALCEMIA: ICD-10-CM

## 2025-05-13 DIAGNOSIS — M85.89 OSTEOPENIA OF MULTIPLE SITES: ICD-10-CM

## 2025-05-13 LAB
25(OH)D3 SERPL-MCNC: 42.9 NG/ML
ALBUMIN SERPL-MCNC: 4.1 G/DL (ref 3.4–5)
ALBUMIN/GLOB SERPL: 1.8 {RATIO} (ref 1.1–2.2)
ALP SERPL-CCNC: 91 U/L (ref 40–129)
ALT SERPL-CCNC: 21 U/L (ref 10–40)
ANION GAP SERPL CALCULATED.3IONS-SCNC: 9 MMOL/L (ref 3–16)
AST SERPL-CCNC: 21 U/L (ref 15–37)
BILIRUB SERPL-MCNC: <0.2 MG/DL (ref 0–1)
BUN SERPL-MCNC: 25 MG/DL (ref 7–20)
CALCIUM SERPL-MCNC: 11.1 MG/DL (ref 8.3–10.6)
CHLORIDE SERPL-SCNC: 105 MMOL/L (ref 99–110)
CO2 SERPL-SCNC: 28 MMOL/L (ref 21–32)
CREAT SERPL-MCNC: 0.9 MG/DL (ref 0.6–1.2)
GFR SERPLBLD CREATININE-BSD FMLA CKD-EPI: 73 ML/MIN/{1.73_M2}
GLUCOSE SERPL-MCNC: 101 MG/DL (ref 70–99)
MAGNESIUM SERPL-MCNC: 1.98 MG/DL (ref 1.8–2.4)
PHOSPHATE SERPL-MCNC: 2.7 MG/DL (ref 2.5–4.9)
POTASSIUM SERPL-SCNC: 4.3 MMOL/L (ref 3.5–5.1)
PROT SERPL-MCNC: 6.4 G/DL (ref 6.4–8.2)
PTH-INTACT SERPL-MCNC: 196 PG/ML (ref 14–72)
SODIUM SERPL-SCNC: 142 MMOL/L (ref 136–145)

## 2025-05-14 ENCOUNTER — RESULTS FOLLOW-UP (OUTPATIENT)
Dept: ENDOCRINOLOGY | Age: 60
End: 2025-05-14

## 2025-05-14 LAB
CALCIUM 24H UR-MRATE: 56 MG/24 HR (ref 42–353)
CREAT 24H UR-MRATE: 0.8 G/24HR (ref 0.6–1.5)
SPECIMEN VOL 24H UR: 1200 ML

## 2025-05-19 DIAGNOSIS — I10 ESSENTIAL HYPERTENSION, BENIGN: ICD-10-CM

## 2025-05-19 DIAGNOSIS — M25.50 MULTIPLE JOINT PAIN: ICD-10-CM

## 2025-05-19 RX ORDER — DULOXETIN HYDROCHLORIDE 30 MG/1
CAPSULE, DELAYED RELEASE ORAL DAILY
Qty: 30 CAPSULE | Refills: 0 | Status: SHIPPED | OUTPATIENT
Start: 2025-05-19

## 2025-05-19 RX ORDER — HYDROCHLOROTHIAZIDE 25 MG/1
25 TABLET ORAL DAILY
Qty: 30 TABLET | Refills: 0 | Status: SHIPPED | OUTPATIENT
Start: 2025-05-19

## 2025-05-19 NOTE — TELEPHONE ENCOUNTER
Okay to fill 30 day supply. Patient is overdue for an appt. [employer] message sent.     Medication:   Requested Prescriptions     Pending Prescriptions Disp Refills    hydroCHLOROthiazide (HYDRODIURIL) 25 MG tablet [Pharmacy Med Name: hydroCHLOROthiazide Oral Tablet 25 MG] 30 tablet 0     Sig: TAKE 1 TABLET BY MOUTH EVERY DAY    DULoxetine (CYMBALTA) 30 MG extended release capsule [Pharmacy Med Name: DULoxetine HCl Oral Capsule Delayed Release Particles 30 MG] 30 capsule 0     Sig: TAKE 1 CAPSULE BY MOUTH EVERY DAY        Patient Phone Number: [phone] (home)     Last appt: 3/19/2025   Next appt: Visit date not found    Last OARRS:        No data to display

## 2025-05-21 ENCOUNTER — HOSPITAL ENCOUNTER (OUTPATIENT)
Dept: GENERAL RADIOLOGY | Age: 60
Discharge: HOME | End: 2025-05-21
Attending: INTERNAL MEDICINE
Payer: COMMERCIAL

## 2025-05-21 DIAGNOSIS — M85.89 OSTEOPENIA OF MULTIPLE SITES: ICD-10-CM

## 2025-05-21 DIAGNOSIS — E21.3 HYPERPARATHYROIDISM: ICD-10-CM

## 2025-05-21 DIAGNOSIS — E55.9 VITAMIN D DEFICIENCY: ICD-10-CM

## 2025-05-21 DIAGNOSIS — E83.52 HYPERCALCEMIA: ICD-10-CM

## 2025-05-21 DIAGNOSIS — Z98.84 H/O GASTRIC BYPASS: ICD-10-CM

## 2025-05-21 PROCEDURE — 77080 DXA BONE DENSITY AXIAL: CPT

## 2025-06-03 ENCOUNTER — OFFICE VISIT (OUTPATIENT)
Dept: INTERNAL MEDICINE CLINIC | Age: 60
End: 2025-06-03
Payer: COMMERCIAL

## 2025-06-03 VITALS
DIASTOLIC BLOOD PRESSURE: 76 MMHG | BODY MASS INDEX: 33.02 KG/M2 | HEART RATE: 71 BPM | WEIGHT: 193.4 LBS | SYSTOLIC BLOOD PRESSURE: 116 MMHG | HEIGHT: 64 IN | OXYGEN SATURATION: 97 %

## 2025-06-03 DIAGNOSIS — E21.3 HYPERPARATHYROIDISM: ICD-10-CM

## 2025-06-03 DIAGNOSIS — M81.0 POST-MENOPAUSAL OSTEOPOROSIS: ICD-10-CM

## 2025-06-03 DIAGNOSIS — Z71.89 ACP (ADVANCE CARE PLANNING): ICD-10-CM

## 2025-06-03 DIAGNOSIS — F41.9 ANXIETY: ICD-10-CM

## 2025-06-03 DIAGNOSIS — Z12.31 ENCOUNTER FOR SCREENING MAMMOGRAM FOR MALIGNANT NEOPLASM OF BREAST: ICD-10-CM

## 2025-06-03 DIAGNOSIS — M25.50 MULTIPLE JOINT PAIN: ICD-10-CM

## 2025-06-03 DIAGNOSIS — E89.0 POSTABLATIVE HYPOTHYROIDISM: ICD-10-CM

## 2025-06-03 DIAGNOSIS — E55.9 VITAMIN D DEFICIENCY: ICD-10-CM

## 2025-06-03 DIAGNOSIS — I10 ESSENTIAL HYPERTENSION, BENIGN: ICD-10-CM

## 2025-06-03 DIAGNOSIS — Z00.00 ENCOUNTER FOR WELL ADULT EXAM WITHOUT ABNORMAL FINDINGS: Primary | ICD-10-CM

## 2025-06-03 PROCEDURE — 99396 PREV VISIT EST AGE 40-64: CPT | Performed by: NURSE PRACTITIONER

## 2025-06-03 PROCEDURE — 3078F DIAST BP <80 MM HG: CPT | Performed by: NURSE PRACTITIONER

## 2025-06-03 PROCEDURE — 3074F SYST BP LT 130 MM HG: CPT | Performed by: NURSE PRACTITIONER

## 2025-06-03 RX ORDER — DULOXETIN HYDROCHLORIDE 30 MG/1
30 CAPSULE, DELAYED RELEASE ORAL DAILY
Qty: 90 CAPSULE | Refills: 1 | Status: SHIPPED | OUTPATIENT
Start: 2025-06-03

## 2025-06-03 RX ORDER — HYDROCHLOROTHIAZIDE 12.5 MG/1
12.5 TABLET ORAL DAILY
Qty: 90 TABLET | Refills: 1 | Status: SHIPPED | OUTPATIENT
Start: 2025-06-03

## 2025-06-03 ASSESSMENT — PATIENT HEALTH QUESTIONNAIRE - PHQ9
SUM OF ALL RESPONSES TO PHQ QUESTIONS 1-9: 0
2. FEELING DOWN, DEPRESSED OR HOPELESS: NOT AT ALL
1. LITTLE INTEREST OR PLEASURE IN DOING THINGS: NOT AT ALL
SUM OF ALL RESPONSES TO PHQ QUESTIONS 1-9: 0

## 2025-06-03 NOTE — ASSESSMENT & PLAN NOTE
Chronic, stable and well-controlled.   Decrease hydrochlorothiazide to 12.5 mg daily.   Continue lisinopril 20 mg daily. Continue working on low-salt diet, active lifestyle and attempting weight loss.   Reviewed recent lab work with pt.  Monitor BP at home and bring log to next appt in 4 weeks for BP check     Orders:    hydroCHLOROthiazide 12.5 MG tablet; Take 1 tablet by mouth daily

## 2025-06-03 NOTE — PROGRESS NOTES
6/3/25     Chief Complaint   Patient presents with    Annual Exam     HPI    Here for annual exam     HTN - BP has been controlled.   Recently had slight decrease in renal function, improved with recheck.       Saw ENT yesterday (Dr. Silva at [facility]), had it drained and determined to be clogged salivary gland.     Allergies   Allergen Reactions    Morphine Itching       Current Outpatient Medications   Medication Sig Dispense Refill    hydroCHLOROthiazide 12.5 MG tablet Take 1 tablet by mouth daily 90 tablet 1    DULoxetine (CYMBALTA) 30 MG extended release capsule Take 1 capsule by mouth daily 90 capsule 1    lisinopril (PRINIVIL;ZESTRIL) 20 MG tablet Take 1 tablet by mouth daily 90 tablet 1    gabapentin (NEURONTIN) 100 MG capsule Take 1 capsule by mouth. 1-3 capsules at  bedtime      levothyroxine (SYNTHROID) 50 MCG tablet Take 1 tablet by mouth daily 90 tablet 1    ipratropium (ATROVENT) 0.06 % nasal spray SPRAY 2 SPRAYS IN EACH NOSTRIL ONCE DAILY 15 mL 3    VITAMIN D-VITAMIN K PO Take by mouth      calcium-vitamin D (OSCAL-500) 500-5 MG-MCG TABS per tablet Take 1 tablet by mouth daily 90 tablet 1    diclofenac sodium (VOLTAREN) 1 % GEL APPLY FOUR GRAM TOPICALLY  FOUR TIMES A DAY AS NEEDED FOR PAIN 100 g 0    triamcinolone (KENALOG) 0.1 % cream Apply topically 2 times daily. 80 g 3     No current facility-administered medications for this visit.     Review of Systems    Vitals:    06/03/25 0840   BP: 116/76   BP Site: Right Upper Arm   Patient Position: Sitting   BP Cuff Size: Large Adult   Pulse: 71   SpO2: 97%   Weight: 87.7 kg (193 lb 6.4 oz)   Height: 1.613 m (5' 3.5\")      Physical Exam    Assessment/Plan:  Assessment & Plan  Encounter for well adult exam without abnormal findings  Annual exam today.   Recent labs reviewed.   HM reviewed, declined immunizations today          ACP (advance care planning)  Information provided.          Encounter for screening mammogram for malignant neoplasm of

## 2025-06-03 NOTE — PATIENT INSTRUCTIONS
Advance Care Planning     Advance Care Planning opens a door to talk about and write down your wishes before a sudden accident or illness.  Make your goals, values, and preferences known.     This puts you in the ’s seat and helps others know what matters most to you so they won’t have to guess.      Where can you learn more?    Go to [email or web address]-[email or web address]   to learn how to:    Name someone you trust to make healthcare decisions for you, only if you can’t. (Healthcare Power of )    Document your wishes for care if you were seriously ill and not expected to recover or are approaching end of life. (Advance Directive or Living Will)    The same page can be found using the QR code below.                Well Visit, Ages 18 to 65: Care Instructions  Well visits can help you stay healthy. Your doctor has checked your overall health and may have suggested ways to take good care of yourself. Your doctor also may have recommended tests. You can help prevent illness with healthy eating, good sleep, vaccinations, regular exercise, and other steps.    Get the tests that you and your doctor decide on. Depending on your age and risks, examples might include screening for diabetes; hepatitis C; HIV; and cervical, breast, lung, and colon cancer. Screening helps find diseases before any symptoms appear.   Eat healthy foods. Choose fruits, vegetables, whole grains, lean protein, and low-fat dairy foods. Limit saturated fat and reduce salt.     Limit alcohol. Men should have no more than 2 drinks a day. Women should have no more than 1. For some people, no alcohol is the best choice.   Exercise. Get at least 30 minutes of exercise on most days of the week. Walking can be a good choice.     Reach and stay at your healthy weight. This will lower your risk for many health problems.   Take care of your mental health. Try to stay connected with friends, family, and community, and find

## 2025-06-03 NOTE — ASSESSMENT & PLAN NOTE
Chronic, stable. Continue levothyroxine as prescribed. Continue seeing endocrinology as scheduled.

## 2025-06-03 NOTE — ASSESSMENT & PLAN NOTE
Chronic, controlled. Continue duloxetine 30 mg daily for dual benefit of anxiety and chronic pain.

## 2025-06-03 NOTE — ASSESSMENT & PLAN NOTE
Chronic, stable. Continue duloxetine 30 mg daily.     Orders:    DULoxetine (CYMBALTA) 30 MG extended release capsule; Take 1 capsule by mouth daily

## 2025-06-23 ENCOUNTER — HOSPITAL ENCOUNTER (OUTPATIENT)
Dept: WOMENS IMAGING | Age: 60
Discharge: HOME | End: 2025-06-23
Payer: COMMERCIAL

## 2025-06-23 VITALS — WEIGHT: 193 LBS | BODY MASS INDEX: 34.2 KG/M2 | HEIGHT: 63 IN

## 2025-06-23 DIAGNOSIS — Z12.31 ENCOUNTER FOR SCREENING MAMMOGRAM FOR MALIGNANT NEOPLASM OF BREAST: ICD-10-CM

## 2025-06-23 PROCEDURE — 77063 BREAST TOMOSYNTHESIS BI: CPT

## 2025-06-24 ENCOUNTER — RESULTS FOLLOW-UP (OUTPATIENT)
Dept: INTERNAL MEDICINE CLINIC | Age: 60
End: 2025-06-24

## 2025-06-30 ENCOUNTER — OFFICE VISIT (OUTPATIENT)
Dept: INTERNAL MEDICINE CLINIC | Age: 60
End: 2025-06-30
Payer: COMMERCIAL

## 2025-06-30 VITALS
BODY MASS INDEX: 30.32 KG/M2 | SYSTOLIC BLOOD PRESSURE: 100 MMHG | HEIGHT: 67 IN | OXYGEN SATURATION: 98 % | HEART RATE: 72 BPM | WEIGHT: 193.2 LBS | DIASTOLIC BLOOD PRESSURE: 60 MMHG

## 2025-06-30 DIAGNOSIS — E21.3 HYPERPARATHYROIDISM: ICD-10-CM

## 2025-06-30 DIAGNOSIS — I10 ESSENTIAL HYPERTENSION, BENIGN: Primary | ICD-10-CM

## 2025-06-30 DIAGNOSIS — E89.0 POSTABLATIVE HYPOTHYROIDISM: ICD-10-CM

## 2025-06-30 PROCEDURE — 3017F COLORECTAL CA SCREEN DOC REV: CPT | Performed by: NURSE PRACTITIONER

## 2025-06-30 PROCEDURE — 1036F TOBACCO NON-USER: CPT | Performed by: NURSE PRACTITIONER

## 2025-06-30 PROCEDURE — G2211 COMPLEX E/M VISIT ADD ON: HCPCS | Performed by: NURSE PRACTITIONER

## 2025-06-30 PROCEDURE — 3074F SYST BP LT 130 MM HG: CPT | Performed by: NURSE PRACTITIONER

## 2025-06-30 PROCEDURE — G8417 CALC BMI ABV UP PARAM F/U: HCPCS | Performed by: NURSE PRACTITIONER

## 2025-06-30 PROCEDURE — 3078F DIAST BP <80 MM HG: CPT | Performed by: NURSE PRACTITIONER

## 2025-06-30 PROCEDURE — 99214 OFFICE O/P EST MOD 30 MIN: CPT | Performed by: NURSE PRACTITIONER

## 2025-06-30 PROCEDURE — G8427 DOCREV CUR MEDS BY ELIG CLIN: HCPCS | Performed by: NURSE PRACTITIONER

## 2025-06-30 RX ORDER — IPRATROPIUM BROMIDE 42 UG/1
2 SPRAY, METERED NASAL DAILY
Qty: 15 ML | Refills: 3 | Status: SHIPPED | OUTPATIENT
Start: 2025-06-30

## 2025-06-30 RX ORDER — MULTIVITAMIN WITH IRON
1 TABLET ORAL DAILY
COMMUNITY

## 2025-06-30 NOTE — PROGRESS NOTES
calcium-vitamin D (OSCAL-500) 500-5 MG-MCG TABS per tablet Take 1 tablet by mouth daily 90 tablet 1    diclofenac sodium (VOLTAREN) 1 % GEL APPLY FOUR GRAM TOPICALLY  FOUR TIMES A DAY AS NEEDED FOR PAIN 100 g 0    triamcinolone (KENALOG) 0.1 % cream Apply topically 2 times daily. 80 g 3     No current facility-administered medications for this visit.     Review of Systems  Negative other than HPI   Results  - Labs:    - slight decrease in renal function, improved with recheck.     - Imaging:    - Mammogram: Normal    - DEXA scan: Stable       Vitals:    06/30/25 1330   BP: 100/60   BP Site: Right Upper Arm   Patient Position: Sitting   BP Cuff Size: Large Adult   Pulse: 72   SpO2: 98%   Weight: 87.6 kg (193 lb 3.2 oz)   Height: 1.702 m (5' 7\")      Physical Exam  Constitutional:       General: She is not in acute distress.     Appearance: Normal appearance. She is not ill-appearing.   HENT:      Head: Normocephalic and atraumatic.   Cardiovascular:      Rate and Rhythm: Normal rate and regular rhythm.   Pulmonary:      Effort: Pulmonary effort is normal. No respiratory distress.   Neurological:      General: No focal deficit present.      Mental Status: She is alert and oriented to person, place, and time. Mental status is at baseline.   Psychiatric:         Mood and Affect: Mood normal.         Behavior: Behavior normal.         Assessment/Plan:  Assessment & Plan  Essential hypertension, benign  Chronic, stable and well-controlled.   - Satisfactory readings  - Previous medication adjustment due to minor renal function decline, now improved  - Lower blood pressure during mushroom consumption  - Discussed in detail and okay to discontinue hydrochlorothiazide  - Okay to resume mushroom supplement  - Monitor blood pressure  - Inform if it increases for potential reintroduction of hydrochlorothiazide at reduced dosage  - Continue lisinopril 20 mg daily.   - Continue working on low-salt diet, active lifestyle and

## 2025-07-01 PROBLEM — N83.201 CYST OF RIGHT OVARY: Status: RESOLVED | Noted: 2024-03-29 | Resolved: 2025-07-01

## 2025-07-01 PROBLEM — R92.8 ABNORMALITY OF LEFT BREAST ON SCREENING MAMMOGRAM: Status: RESOLVED | Noted: 2024-01-29 | Resolved: 2025-07-01

## 2025-07-01 NOTE — ASSESSMENT & PLAN NOTE
Chronic, stable and well-controlled.   - Satisfactory readings  - Previous medication adjustment due to minor renal function decline, now improved  - Lower blood pressure during mushroom consumption  - Discussed in detail and okay to discontinue hydrochlorothiazide  - Okay to resume mushroom supplement  - Monitor blood pressure  - Inform if it increases for potential reintroduction of hydrochlorothiazide at reduced dosage  - Continue lisinopril 20 mg daily.   - Continue working on low-salt diet, active lifestyle and attempting weight loss.   - Reviewed recent lab work with pt.  -Monitor BP at home and bring log to next appt

## 2025-07-10 DIAGNOSIS — M25.50 MULTIPLE JOINT PAIN: ICD-10-CM

## 2025-07-11 RX ORDER — DULOXETIN HYDROCHLORIDE 30 MG/1
30 CAPSULE, DELAYED RELEASE ORAL DAILY
Qty: 90 CAPSULE | Refills: 0 | Status: SHIPPED | OUTPATIENT
Start: 2025-07-11